# Patient Record
Sex: MALE | Race: WHITE | Employment: FULL TIME | URBAN - METROPOLITAN AREA
[De-identification: names, ages, dates, MRNs, and addresses within clinical notes are randomized per-mention and may not be internally consistent; named-entity substitution may affect disease eponyms.]

---

## 2020-06-06 ENCOUNTER — APPOINTMENT (OUTPATIENT)
Dept: MRI IMAGING | Age: 60
DRG: 063 | End: 2020-06-06
Attending: HOSPITALIST

## 2020-06-06 ENCOUNTER — HOSPITAL ENCOUNTER (INPATIENT)
Age: 60
LOS: 1 days | Discharge: HOME HEALTH | DRG: 063 | End: 2020-06-07
Attending: EMERGENCY MEDICINE | Admitting: HOSPITALIST

## 2020-06-06 ENCOUNTER — APPOINTMENT (OUTPATIENT)
Dept: GENERAL RADIOLOGY | Age: 60
DRG: 063 | End: 2020-06-06
Attending: EMERGENCY MEDICINE

## 2020-06-06 ENCOUNTER — APPOINTMENT (OUTPATIENT)
Dept: CT IMAGING | Age: 60
DRG: 063 | End: 2020-06-06
Attending: EMERGENCY MEDICINE

## 2020-06-06 ENCOUNTER — APPOINTMENT (OUTPATIENT)
Dept: NON INVASIVE DIAGNOSTICS | Age: 60
DRG: 063 | End: 2020-06-06
Attending: HOSPITALIST

## 2020-06-06 DIAGNOSIS — R20.0 RIGHT SIDED NUMBNESS: Primary | ICD-10-CM

## 2020-06-06 DIAGNOSIS — I63.9: ICD-10-CM

## 2020-06-06 DIAGNOSIS — R44.8: ICD-10-CM

## 2020-06-06 DIAGNOSIS — E11.9 NEW ONSET TYPE 2 DIABETES MELLITUS (HCC): ICD-10-CM

## 2020-06-06 PROBLEM — R73.9 HYPERGLYCEMIA: Status: ACTIVE | Noted: 2020-06-06

## 2020-06-06 LAB
ANION GAP SERPL CALC-SCNC: 5 MMOL/L (ref 3–18)
APPEARANCE UR: CLEAR
APTT PPP: 28.9 SEC (ref 23–36.4)
BASOPHILS # BLD: 0.1 K/UL (ref 0–0.1)
BASOPHILS NFR BLD: 1 % (ref 0–2)
BILIRUB UR QL: NEGATIVE
BUN SERPL-MCNC: 11 MG/DL (ref 7–18)
BUN/CREAT SERPL: 10 (ref 12–20)
CALCIUM SERPL-MCNC: 9 MG/DL (ref 8.5–10.1)
CHLORIDE SERPL-SCNC: 102 MMOL/L (ref 100–111)
CK MB CFR SERPL CALC: 0.9 % (ref 0–4)
CK MB SERPL-MCNC: 1.8 NG/ML (ref 5–25)
CK SERPL-CCNC: 198 U/L (ref 39–308)
CO2 SERPL-SCNC: 28 MMOL/L (ref 21–32)
COLOR UR: YELLOW
CREAT SERPL-MCNC: 1.08 MG/DL (ref 0.6–1.3)
DIFFERENTIAL METHOD BLD: ABNORMAL
ECHO AO ASC DIAM: 3.17 CM
ECHO AO ROOT DIAM: 2.92 CM
ECHO IVC SNIFF: 1.57 CM
ECHO LA MAJOR AXIS: 3.75 CM
ECHO LA TO AORTIC ROOT RATIO: 1.28
ECHO LV EDV A2C: 104.4 ML
ECHO LV EDV A4C: 111.5 ML
ECHO LV EDV BP: 108.7 ML (ref 67–155)
ECHO LV EDV INDEX A4C: 52.9 ML/M2
ECHO LV EDV INDEX BP: 51.5 ML/M2
ECHO LV EDV NDEX A2C: 49.5 ML/M2
ECHO LV EDV TEICHHOLZ: 0.55 ML
ECHO LV EJECTION FRACTION A2C: 52 %
ECHO LV EJECTION FRACTION A4C: 63 %
ECHO LV EJECTION FRACTION BIPLANE: 57.7 % (ref 55–100)
ECHO LV ESV A2C: 50.2 ML
ECHO LV ESV A4C: 41 ML
ECHO LV ESV BP: 46 ML (ref 22–58)
ECHO LV ESV INDEX A2C: 23.8 ML/M2
ECHO LV ESV INDEX A4C: 19.4 ML/M2
ECHO LV ESV INDEX BP: 21.8 ML/M2
ECHO LV ESV TEICHHOLZ: 0.41 ML
ECHO LV INTERNAL DIMENSION DIASTOLIC: 4.62 CM (ref 4.2–5.9)
ECHO LV INTERNAL DIMENSION SYSTOLIC: 4.09 CM
ECHO LV IVSD: 1.37 CM (ref 0.6–1)
ECHO LV MASS 2D: 290.5 G (ref 88–224)
ECHO LV MASS INDEX 2D: 137.7 G/M2 (ref 49–115)
ECHO LV POSTERIOR WALL DIASTOLIC: 1.33 CM (ref 0.6–1)
ECHO LVOT DIAM: 2.53 CM
ECHO MV A VELOCITY: 55.49 CM/S
ECHO MV E DECELERATION TIME (DT): 186.4 MS
ECHO MV E VELOCITY: 48.21 CM/S
ECHO MV E/A RATIO: 0.87
ECHO RA MINOR AXIS: 4.02 CM
EOSINOPHIL # BLD: 0.3 K/UL (ref 0–0.4)
EOSINOPHIL NFR BLD: 4 % (ref 0–5)
ERYTHROCYTE [DISTWIDTH] IN BLOOD BY AUTOMATED COUNT: 11.9 % (ref 11.6–14.5)
EST. AVERAGE GLUCOSE BLD GHB EST-MCNC: 278 MG/DL
GLUCOSE BLD STRIP.AUTO-MCNC: 219 MG/DL (ref 70–110)
GLUCOSE BLD STRIP.AUTO-MCNC: 220 MG/DL (ref 70–110)
GLUCOSE BLD STRIP.AUTO-MCNC: 264 MG/DL (ref 70–110)
GLUCOSE BLD STRIP.AUTO-MCNC: 326 MG/DL (ref 70–110)
GLUCOSE SERPL-MCNC: 337 MG/DL (ref 74–99)
GLUCOSE UR STRIP.AUTO-MCNC: >1000 MG/DL
HBA1C MFR BLD: 11.3 % (ref 4.2–5.6)
HCT VFR BLD AUTO: 45.4 % (ref 36–48)
HGB BLD-MCNC: 16 G/DL (ref 13–16)
HGB UR QL STRIP: NEGATIVE
INR PPP: 1 (ref 0.8–1.2)
KETONES UR QL STRIP.AUTO: NEGATIVE MG/DL
LEUKOCYTE ESTERASE UR QL STRIP.AUTO: NEGATIVE
LVFS 2D: 11.51 %
LVSV (MOD BI): 29.3 ML
LVSV (MOD SINGLE 4C): 32.91 ML
LVSV (MOD SINGLE): 25.31 ML
LVSV (TEICH): 11.47 ML
LYMPHOCYTES # BLD: 4 K/UL (ref 0.9–3.6)
LYMPHOCYTES NFR BLD: 49 % (ref 21–52)
MAGNESIUM SERPL-MCNC: 1.9 MG/DL (ref 1.6–2.6)
MCH RBC QN AUTO: 31.1 PG (ref 24–34)
MCHC RBC AUTO-ENTMCNC: 35.2 G/DL (ref 31–37)
MCV RBC AUTO: 88.2 FL (ref 74–97)
MONOCYTES # BLD: 0.9 K/UL (ref 0.05–1.2)
MONOCYTES NFR BLD: 11 % (ref 3–10)
MV DEC SLOPE: 2.59
NEUTS SEG # BLD: 2.8 K/UL (ref 1.8–8)
NEUTS SEG NFR BLD: 35 % (ref 40–73)
NITRITE UR QL STRIP.AUTO: NEGATIVE
PH UR STRIP: 6 [PH] (ref 5–8)
PLATELET # BLD AUTO: 258 K/UL (ref 135–420)
PMV BLD AUTO: 9.2 FL (ref 9.2–11.8)
POTASSIUM SERPL-SCNC: 3.7 MMOL/L (ref 3.5–5.5)
PROT UR STRIP-MCNC: NEGATIVE MG/DL
PROTHROMBIN TIME: 13.3 SEC (ref 11.5–15.2)
RBC # BLD AUTO: 5.15 M/UL (ref 4.7–5.5)
SODIUM SERPL-SCNC: 135 MMOL/L (ref 136–145)
SP GR UR REFRACTOMETRY: >1.03 (ref 1–1.03)
TROPONIN I SERPL-MCNC: <0.02 NG/ML (ref 0–0.04)
UROBILINOGEN UR QL STRIP.AUTO: 0.2 EU/DL (ref 0.2–1)
WBC # BLD AUTO: 8.1 K/UL (ref 4.6–13.2)

## 2020-06-06 PROCEDURE — 80048 BASIC METABOLIC PNL TOTAL CA: CPT

## 2020-06-06 PROCEDURE — 74011636637 HC RX REV CODE- 636/637: Performed by: HOSPITALIST

## 2020-06-06 PROCEDURE — 74011250636 HC RX REV CODE- 250/636: Performed by: EMERGENCY MEDICINE

## 2020-06-06 PROCEDURE — 65610000006 HC RM INTENSIVE CARE

## 2020-06-06 PROCEDURE — 83036 HEMOGLOBIN GLYCOSYLATED A1C: CPT

## 2020-06-06 PROCEDURE — 74011000258 HC RX REV CODE- 258: Performed by: EMERGENCY MEDICINE

## 2020-06-06 PROCEDURE — 85025 COMPLETE CBC W/AUTO DIFF WBC: CPT

## 2020-06-06 PROCEDURE — 77030021566 MRA NECK W WO CONT

## 2020-06-06 PROCEDURE — 93005 ELECTROCARDIOGRAM TRACING: CPT

## 2020-06-06 PROCEDURE — 81003 URINALYSIS AUTO W/O SCOPE: CPT

## 2020-06-06 PROCEDURE — 82550 ASSAY OF CK (CPK): CPT

## 2020-06-06 PROCEDURE — 74011250637 HC RX REV CODE- 250/637: Performed by: HOSPITALIST

## 2020-06-06 PROCEDURE — 70450 CT HEAD/BRAIN W/O DYE: CPT

## 2020-06-06 PROCEDURE — A9575 INJ GADOTERATE MEGLUMI 0.1ML: HCPCS | Performed by: EMERGENCY MEDICINE

## 2020-06-06 PROCEDURE — 37195 THROMBOLYTIC THERAPY STROKE: CPT

## 2020-06-06 PROCEDURE — 3E03317 INTRODUCTION OF OTHER THROMBOLYTIC INTO PERIPHERAL VEIN, PERCUTANEOUS APPROACH: ICD-10-PCS | Performed by: EMERGENCY MEDICINE

## 2020-06-06 PROCEDURE — 70551 MRI BRAIN STEM W/O DYE: CPT

## 2020-06-06 PROCEDURE — 99291 CRITICAL CARE FIRST HOUR: CPT

## 2020-06-06 PROCEDURE — 71045 X-RAY EXAM CHEST 1 VIEW: CPT

## 2020-06-06 PROCEDURE — 83735 ASSAY OF MAGNESIUM: CPT

## 2020-06-06 PROCEDURE — 74011000250 HC RX REV CODE- 250: Performed by: EMERGENCY MEDICINE

## 2020-06-06 PROCEDURE — 72125 CT NECK SPINE W/O DYE: CPT

## 2020-06-06 PROCEDURE — 85730 THROMBOPLASTIN TIME PARTIAL: CPT

## 2020-06-06 PROCEDURE — 97162 PT EVAL MOD COMPLEX 30 MIN: CPT

## 2020-06-06 PROCEDURE — 82962 GLUCOSE BLOOD TEST: CPT

## 2020-06-06 PROCEDURE — 93306 TTE W/DOPPLER COMPLETE: CPT

## 2020-06-06 PROCEDURE — 70544 MR ANGIOGRAPHY HEAD W/O DYE: CPT

## 2020-06-06 PROCEDURE — 85610 PROTHROMBIN TIME: CPT

## 2020-06-06 PROCEDURE — 74011250637 HC RX REV CODE- 250/637: Performed by: EMERGENCY MEDICINE

## 2020-06-06 RX ORDER — INSULIN GLARGINE 100 [IU]/ML
0.2 INJECTION, SOLUTION SUBCUTANEOUS DAILY
Status: DISCONTINUED | OUTPATIENT
Start: 2020-06-06 | End: 2020-06-06

## 2020-06-06 RX ORDER — ACETAMINOPHEN 325 MG/1
650 TABLET ORAL
Status: DISCONTINUED | OUTPATIENT
Start: 2020-06-06 | End: 2020-06-07 | Stop reason: HOSPADM

## 2020-06-06 RX ORDER — INSULIN GLARGINE 100 [IU]/ML
0.26 INJECTION, SOLUTION SUBCUTANEOUS DAILY
Status: DISCONTINUED | OUTPATIENT
Start: 2020-06-07 | End: 2020-06-07 | Stop reason: HOSPADM

## 2020-06-06 RX ORDER — MAGNESIUM SULFATE 100 %
4 CRYSTALS MISCELLANEOUS AS NEEDED
Status: DISCONTINUED | OUTPATIENT
Start: 2020-06-06 | End: 2020-06-07 | Stop reason: HOSPADM

## 2020-06-06 RX ORDER — SODIUM CHLORIDE 0.9 % (FLUSH) 0.9 %
5-40 SYRINGE (ML) INJECTION AS NEEDED
Status: DISCONTINUED | OUTPATIENT
Start: 2020-06-06 | End: 2020-06-07 | Stop reason: HOSPADM

## 2020-06-06 RX ORDER — ASPIRIN 325 MG
325 TABLET ORAL DAILY
Status: DISCONTINUED | OUTPATIENT
Start: 2020-06-07 | End: 2020-06-07 | Stop reason: HOSPADM

## 2020-06-06 RX ORDER — INSULIN GLARGINE 100 [IU]/ML
4 INJECTION, SOLUTION SUBCUTANEOUS ONCE
Status: COMPLETED | OUTPATIENT
Start: 2020-06-06 | End: 2020-06-06

## 2020-06-06 RX ORDER — GADOTERATE MEGLUMINE 376.9 MG/ML
20 INJECTION INTRAVENOUS
Status: COMPLETED | OUTPATIENT
Start: 2020-06-06 | End: 2020-06-06

## 2020-06-06 RX ORDER — NICARDIPINE HYDROCHLORIDE 0.1 MG/ML
5 INJECTION INTRAVENOUS CONTINUOUS
Status: DISCONTINUED | OUTPATIENT
Start: 2020-06-06 | End: 2020-06-07 | Stop reason: HOSPADM

## 2020-06-06 RX ORDER — ROSUVASTATIN CALCIUM 10 MG/1
40 TABLET, COATED ORAL
Status: DISCONTINUED | OUTPATIENT
Start: 2020-06-06 | End: 2020-06-06

## 2020-06-06 RX ORDER — SODIUM CHLORIDE 0.9 % (FLUSH) 0.9 %
5-40 SYRINGE (ML) INJECTION EVERY 8 HOURS
Status: DISCONTINUED | OUTPATIENT
Start: 2020-06-06 | End: 2020-06-07 | Stop reason: HOSPADM

## 2020-06-06 RX ORDER — INSULIN LISPRO 100 [IU]/ML
INJECTION, SOLUTION INTRAVENOUS; SUBCUTANEOUS
Status: DISCONTINUED | OUTPATIENT
Start: 2020-06-06 | End: 2020-06-07 | Stop reason: HOSPADM

## 2020-06-06 RX ORDER — DEXTROSE MONOHYDRATE 100 MG/ML
125-250 INJECTION, SOLUTION INTRAVENOUS AS NEEDED
Status: DISCONTINUED | OUTPATIENT
Start: 2020-06-06 | End: 2020-06-07 | Stop reason: HOSPADM

## 2020-06-06 RX ORDER — SODIUM CHLORIDE 0.9 % (FLUSH) 0.9 %
5-40 SYRINGE (ML) INJECTION EVERY 8 HOURS
Status: DISCONTINUED | OUTPATIENT
Start: 2020-06-06 | End: 2020-06-06

## 2020-06-06 RX ORDER — SODIUM CHLORIDE 0.9 % (FLUSH) 0.9 %
5-40 SYRINGE (ML) INJECTION AS NEEDED
Status: DISCONTINUED | OUTPATIENT
Start: 2020-06-06 | End: 2020-06-06

## 2020-06-06 RX ORDER — ATORVASTATIN CALCIUM 20 MG/1
80 TABLET, FILM COATED ORAL
Status: DISCONTINUED | OUTPATIENT
Start: 2020-06-06 | End: 2020-06-07 | Stop reason: HOSPADM

## 2020-06-06 RX ORDER — ENOXAPARIN SODIUM 100 MG/ML
40 INJECTION SUBCUTANEOUS EVERY 24 HOURS
Status: DISCONTINUED | OUTPATIENT
Start: 2020-06-07 | End: 2020-06-07 | Stop reason: HOSPADM

## 2020-06-06 RX ORDER — ACETAMINOPHEN 500 MG
1000 TABLET ORAL
Status: COMPLETED | OUTPATIENT
Start: 2020-06-06 | End: 2020-06-06

## 2020-06-06 RX ORDER — ONDANSETRON 2 MG/ML
4 INJECTION INTRAMUSCULAR; INTRAVENOUS
Status: DISCONTINUED | OUTPATIENT
Start: 2020-06-06 | End: 2020-06-06 | Stop reason: SDUPTHER

## 2020-06-06 RX ORDER — ONDANSETRON 2 MG/ML
4 INJECTION INTRAMUSCULAR; INTRAVENOUS
Status: DISCONTINUED | OUTPATIENT
Start: 2020-06-06 | End: 2020-06-07 | Stop reason: HOSPADM

## 2020-06-06 RX ADMIN — INSULIN LISPRO 6 UNITS: 100 INJECTION, SOLUTION INTRAVENOUS; SUBCUTANEOUS at 21:36

## 2020-06-06 RX ADMIN — ACETAMINOPHEN 1000 MG: 500 TABLET, FILM COATED ORAL at 03:13

## 2020-06-06 RX ADMIN — GADOTERATE MEGLUMINE 20 ML: 376.9 INJECTION INTRAVENOUS at 09:15

## 2020-06-06 RX ADMIN — ACETAMINOPHEN 650 MG: 325 TABLET, FILM COATED ORAL at 16:34

## 2020-06-06 RX ADMIN — Medication 10 ML: at 21:36

## 2020-06-06 RX ADMIN — SODIUM CHLORIDE 50 ML: 900 INJECTION, SOLUTION INTRAVENOUS at 01:55

## 2020-06-06 RX ADMIN — INSULIN GLARGINE 19 UNITS: 100 INJECTION, SOLUTION SUBCUTANEOUS at 12:02

## 2020-06-06 RX ADMIN — Medication 10 ML: at 12:06

## 2020-06-06 RX ADMIN — ALTEPLASE 75.33 MG: KIT at 01:04

## 2020-06-06 RX ADMIN — ROSUVASTATIN CALCIUM 40 MG: 20 TABLET, COATED ORAL at 02:17

## 2020-06-06 RX ADMIN — ATORVASTATIN CALCIUM 80 MG: 20 TABLET, FILM COATED ORAL at 21:16

## 2020-06-06 RX ADMIN — INSULIN LISPRO 4 UNITS: 100 INJECTION, SOLUTION INTRAVENOUS; SUBCUTANEOUS at 17:27

## 2020-06-06 RX ADMIN — INSULIN LISPRO 6 UNITS: 100 INJECTION, SOLUTION INTRAVENOUS; SUBCUTANEOUS at 12:02

## 2020-06-06 RX ADMIN — Medication 10 ML: at 01:16

## 2020-06-06 RX ADMIN — INSULIN GLARGINE 4 UNITS: 100 INJECTION, SOLUTION SUBCUTANEOUS at 22:40

## 2020-06-06 RX ADMIN — ALTEPLASE 8.37 MG: KIT at 01:03

## 2020-06-06 RX ADMIN — ACETAMINOPHEN 650 MG: 325 TABLET, FILM COATED ORAL at 22:41

## 2020-06-06 NOTE — PROGRESS NOTES
Problem: Discharge Planning  Goal: *Discharge to safe environment  Outcome: Progressing Towards Goal  Plan is home with home health -[id number] Provider list has been given to the patient and/or patient representative. Patient and/or patient representative has signed the [facility] of Choice selecting __[facility]as their preference agency and a copy given. Both Home Health Provider list and Freedom of Choice have been placed on the chart. Referal placed in chart and faxed to BS. Patient works so will not qualify for PT/OT - not bed bound. Also he has to  self pay for any extra therapy- no insurance. If needs to get a walker- he will need to self pay for that also. Reason for Admission:  Right side weakness and numbness                    RUR Score:  8%                   Plan for utilizing home health:   Qualifies for [facility]   - A1c 11.3    PCP: none I put note in \"follow up \" for pt to call [facility] Monday or Tuesday to make an appt. First and Last name:     Name of Practice:    Are you a current patient: Yes/No:    Approximate date of last visit:    Can you participate in a virtual visit with your PCP:                     Current Advanced Directive/Advance Care Plan: none, but patient interested In it- Page to  to come to see pt to make one. Transition of Care Plan: Chart reviewed and pt  Verified demographics. He works at one of his [employer]- he does not have any insurance thru them- very small numbers of employees. Patient has designated _Mother______ to participate in his/her discharge plan and to receive any needed information. Name: Violeta Medrano  Address:  Phone number:  [phone]    ALSO- patient completed ACP- his Ivykirby Gee will be listed and  says he put a copy in the paper chart to scan chart.         Care Management Interventions  PCP Verified by CM: No  Palliative Care Criteria Met (RRAT>21 & CHF Dx)?: No  Mode of Transport at Discharge: Other (see comment)(Sister to drive)  Transition of Care Consult (CM Consult): Discharge Planning  MyChart Signup: No  Discharge Durable Medical Equipment: No  Physical Therapy Consult: Yes  Occupational Therapy Consult: Yes  Speech Therapy Consult: Yes       Onel Trevino.  ANTWON Ibanez  [facility]  [phone], Pager [phone]  [email or web address]

## 2020-06-06 NOTE — ED NOTES
TRANSFER - ED to INPATIENT REPORT:    Verbal report given to Floresita(name) on Kristofer Colbert  being transferred to [facility](unit) for routine progression of care       Report consisted of patients Situation, Background, Assessment and   Recommendations(SBAR). SBAR report made available to receiving floor on this patient being transferred to [address] [place] [address] ICU [id number])  for routine progression of care       Admitting diagnosis CVA (cerebral vascular accident) [facility]) [I63.9]    Information from the following report(s) SBAR, MAR, Recent Results and Cardiac Rhythm NSR was made available to receiving floor. Lines:   Peripheral IV 06/06/20 Left Antecubital (Active)   Site Assessment Clean, dry, & intact 6/6/2020 12:00 AM   Phlebitis Assessment 0 6/6/2020 12:00 AM   Infiltration Assessment 0 6/6/2020 12:00 AM   Dressing Type Transparent;Tape 6/6/2020 12:00 AM   Hub Color/Line Status Green;Capped;Flushed;Patent 6/6/2020 12:00 AM   Action Taken Open ports on tubing capped 6/6/2020 12:00 AM   Alcohol Cap Used Yes 6/6/2020 12:00 AM       Peripheral IV 06/06/20 Distal;Right Antecubital (Active)   Site Assessment Clean, dry, & intact 6/6/2020 12:39 AM   Phlebitis Assessment 0 6/6/2020 12:39 AM   Infiltration Assessment 0 6/6/2020 12:39 AM   Dressing Type Transparent;Tape 6/6/2020 12:39 AM   Hub Color/Line Status Green;Capped;Flushed;Patent 6/6/2020 12:39 AM   Action Taken Open ports on tubing capped 6/6/2020 12:39 AM   Alcohol Cap Used Yes 6/6/2020 12:39 AM        HCG Status for ALL Females under 55 y/o: NO     Medication list confirmed with patient    Opportunity for questions and clarification was provided.       Patient is oriented to time, place, person and situation Last NIH 1  Patient is  continent and ambulatory with assist     Valuables transported with patient     Patient transported with:   Monitor  Registered Nurse    MAP (Monitor): 95 =Monitored (most recent)  Vitals w/ MEWS Score (last day)     Date/Time MEWS Score Pulse Resp Temp BP Level of Consciousness SpO2    06/06/20 0730    69  18  98 °F (36.7 °C)  143/79    96 %    06/06/20 0715    72  17    151/86    99 %    06/06/20 0700    74  19    161/90    97 %    06/06/20 0645    71  18    (!) 157/91    99 %    06/06/20 0630    72  12    (!) 160/92    97 %    06/06/20 0615    73  22    144/90    98 %    06/06/20 0600    69  18    151/86    97 %    06/06/20 0545    63  17    138/81    96 %    06/06/20 0530    66  18    143/83    96 %    06/06/20 0515    70  17    (!) 153/98    98 %    06/06/20 0500    69  19    139/90    94 %    06/06/20 0445    72  23    145/90    97 %    06/06/20 0430    73  18    153/87    96 %    06/06/20 0415    74  19    (!) 156/91    96 %    06/06/20 0400    76  21  98.2 °F (36.8 °C)  (!) 156/94    97 %    06/06/20 0345    77  18    159/87    96 %    06/06/20 0330    79  18    162/88    98 %    06/06/20 0315    80  18    162/86    98 %    06/06/20 0300    82  17    161/88    96 %    06/06/20 0245    79  14    (!) 162/92    99 %    06/06/20 0230    82  16    161/90    95 %    06/06/20 0215    86  18    (!) 169/97    98 %    06/06/20 0200    88  15    159/83    98 %    06/06/20 0145    86  22    (!) 149/95    99 %    06/06/20 0130    86  17    161/86    97 %    06/06/20 0115    92  20    (!) 174/93    98 %    06/06/20 0100    90  20    161/90    97 %    06/06/20 00:50:51        98.4 °F (36.9 °C)    Alert      06/06/20 0045    92  15    (!) 179/96    97 %    06/06/20 0032    92  17    171/84    99 %    06/06/20 0030    95  11        99 %    06/06/20 0028          157/90    97 %    06/06/20 0015          (!) 155/109        06/06/20 0014  1  93  18  98.2 °F (36.8 °C)  (!) 168/93  Alert  98 %    06/06/20 0009    92  20  98.3 °F (36.8 °C)    Alert                Septic Patients:     Lactic Acid  No results found for: LACPOC (Most recent on top)  Repeat drawn: NO Time: NA     ALL LACTIC ACIDS GREATER THAN 2 MUST BE REPEATED POC WITHIN 4 HOURS OR PRIOR TO ADMISSION               Total Fluid Bolus initiated and documented on MAR: NO    All ordered antibiotics initiated within first 3 hours of TIME ZERO?   NO

## 2020-06-06 NOTE — PROGRESS NOTES
Problem: Discharge Planning  Goal: *Discharge to safe environment  Outcome: Progressing Towards Goal  Plan is home with home health -[facility]

## 2020-06-06 NOTE — ED PROVIDER NOTES
EMERGENCY DEPARTMENT HISTORY AND PHYSICAL EXAM    Date: 6/6/2020  Patient Name: Owen Ellis    History of Presenting Illness     Chief complaint right-sided numbness      History Provided By: Patient and EMS    Additional History (Context):   12:14 AM  Owen Ellis is a 60 y.o. male with PMHX of previous good health who presents to the emergency department C/O right-sided numbness from neck to foot. Patient was also walking his dog when he had sudden onset right-sided numbness time of onset was 11:40 PM.  He did not have any symptoms of weakness, chest pain headache or neck pain. He denies any visual scotomas nausea. He has no belly pain and denies any previous similar symptoms. He is never had any cardiac arrhythmias heart attacks or strokes in his past.  He does have positive family history of sister having a stroke who is 11 months older. He denies any smoking drinking or drugs she quit smoking a number of years ago. Social History  Quit smoking several years ago, denies alcohol or substance use. Family History  Sibling sister with strokes she is 3years older. PCP: None        Past History     Past Medical History:  No past medical history on file. Past Surgical History:  No past surgical history on file. Family History:  No family history on file. Social History:  Social History     Tobacco Use    Smoking status: Not on file   Substance Use Topics    Alcohol use: Not on file    Drug use: Not on file       Allergies:  No Known Allergies      Review of Systems   Review of Systems   Neurological: Positive for numbness. Poor coordination to right lower extremity   All other systems reviewed and are negative.         Physical Exam     Vitals:    06/06/20 0230 06/06/20 0245 06/06/20 0300 06/06/20 0315   BP: 161/90 (!) 162/92 161/88 162/86   Pulse: 82 79 82 80   Resp: 16 14 17 18   Temp:       SpO2: 95% 99% 96% 98%   Weight:       Height:         Physical Exam  Vitals signs and nursing note reviewed. Constitutional:       General: He is not in acute distress. Appearance: He is well-developed. He is not diaphoretic. HENT:      Head: Normocephalic and atraumatic. Right Ear: External ear normal.      Left Ear: External ear normal.      Mouth/Throat:      Pharynx: No oropharyngeal exudate. Eyes:      General: No scleral icterus. Conjunctiva/sclera: Conjunctivae normal.      Pupils: Pupils are equal, round, and reactive to light. Comments: No pallor   Neck:      Musculoskeletal: Normal range of motion and neck supple. Thyroid: No thyromegaly. Vascular: No JVD. Trachea: No tracheal deviation. Cardiovascular:      Rate and Rhythm: Normal rate and regular rhythm. Heart sounds: Normal heart sounds. Pulmonary:      Effort: Pulmonary effort is normal. No respiratory distress. Breath sounds: Normal breath sounds. No stridor. Abdominal:      General: Bowel sounds are normal. There is no distension. Palpations: Abdomen is soft. Tenderness: There is no abdominal tenderness. There is no guarding or rebound. Musculoskeletal: Normal range of motion. General: No tenderness. Comments: No soft tissue injuries   Lymphadenopathy:      Cervical: No cervical adenopathy. Skin:     General: Skin is warm and dry. Findings: No erythema or rash. Neurological:      Mental Status: He is alert and oriented to person, place, and time. GCS: GCS eye subscore is 4. GCS verbal subscore is 5. GCS motor subscore is 6. Cranial Nerves: No cranial nerve deficit. Sensory: Sensory deficit present. Motor: Motor function is intact. No weakness, tremor or atrophy. Coordination: Coordination normal.      Deep Tendon Reflexes: Reflexes are normal and symmetric. Reflexes normal.      Reflex Scores:       Patellar reflexes are 2+ on the right side and 2+ on the left side. Comments:  There is diminished sensation to the right upper and right lower extremity as well as some diminished perceived sensation to the right flank and torso. There is also some decreased coordination in the right lower extremity. No pronator drift normal rapid alternating movements of upper extremities   Psychiatric:         Behavior: Behavior normal.         Thought Content: Thought content normal.         Judgment: Judgment normal.           Diagnostic Study Results     Labs -     Recent Results (from the past 12 hour(s))   CBC WITH AUTOMATED DIFF    Collection Time: 06/06/20 12:10 AM   Result Value Ref Range    WBC 8.1 4.6 - 13.2 K/uL    RBC 5.15 4.70 - 5.50 M/uL    HGB 16.0 13.0 - 16.0 g/dL    HCT 45.4 36.0 - 48.0 %    MCV 88.2 74.0 - 97.0 FL    MCH 31.1 24.0 - 34.0 PG    MCHC 35.2 31.0 - 37.0 g/dL    RDW 11.9 11.6 - 14.5 %    PLATELET 067 948 - 759 K/uL    MPV 9.2 9.2 - 11.8 FL    NEUTROPHILS 35 (L) 40 - 73 %    LYMPHOCYTES 49 21 - 52 %    MONOCYTES 11 (H) 3 - 10 %    EOSINOPHILS 4 0 - 5 %    BASOPHILS 1 0 - 2 %    ABS. NEUTROPHILS 2.8 1.8 - 8.0 K/UL    ABS. LYMPHOCYTES 4.0 (H) 0.9 - 3.6 K/UL    ABS. MONOCYTES 0.9 0.05 - 1.2 K/UL    ABS. EOSINOPHILS 0.3 0.0 - 0.4 K/UL    ABS.  BASOPHILS 0.1 0.0 - 0.1 K/UL    DF AUTOMATED     METABOLIC PANEL, BASIC    Collection Time: 06/06/20 12:10 AM   Result Value Ref Range    Sodium 135 (L) 136 - 145 mmol/L    Potassium 3.7 3.5 - 5.5 mmol/L    Chloride 102 100 - 111 mmol/L    CO2 28 21 - 32 mmol/L    Anion gap 5 3.0 - 18 mmol/L    Glucose 337 (H) 74 - 99 mg/dL    BUN 11 7.0 - 18 MG/DL    Creatinine 1.08 0.6 - 1.3 MG/DL    BUN/Creatinine ratio 10 (L) 12 - 20      GFR est AA >60 >60 ml/min/1.73m2    GFR est non-AA >60 >60 ml/min/1.73m2    Calcium 9.0 8.5 - 10.1 MG/DL   MAGNESIUM    Collection Time: 06/06/20 12:10 AM   Result Value Ref Range    Magnesium 1.9 1.6 - 2.6 mg/dL   CARDIAC PANEL,(CK, CKMB & TROPONIN)    Collection Time: 06/06/20 12:10 AM   Result Value Ref Range    CK - MB 1.8 <3.6 ng/ml    CK-MB Index 0.9 0.0 - 4.0 %     39 - 308 U/L    Troponin-I, QT <0.02 0.0 - 0.045 NG/ML   PROTHROMBIN TIME + INR    Collection Time: 06/06/20 12:10 AM   Result Value Ref Range    Prothrombin time 13.3 11.5 - 15.2 sec    INR 1.0 0.8 - 1.2     PTT    Collection Time: 06/06/20 12:10 AM   Result Value Ref Range    aPTT 28.9 23.0 - 36.4 SEC   GLUCOSE, POC    Collection Time: 06/06/20 12:11 AM   Result Value Ref Range    Glucose (POC) 326 (H) 70 - 110 mg/dL   URINALYSIS W/ RFLX MICROSCOPIC    Collection Time: 06/06/20  2:09 AM   Result Value Ref Range    Color YELLOW      Appearance CLEAR      Specific gravity >1.030 (H) 1.005 - 1.030    pH (UA) 6.0 5.0 - 8.0      Protein Negative NEG mg/dL    Glucose >1,000 (A) NEG mg/dL    Ketone Negative NEG mg/dL    Bilirubin Negative NEG      Blood Negative NEG      Urobilinogen 0.2 0.2 - 1.0 EU/dL    Nitrites Negative NEG      Leukocyte Esterase Negative NEG         Radiologic Studies -   CT SPINE CERV WO CONT   Final Result   IMPRESSION:      No cervical spine fracture or acute malalignment. CT HEAD WO CONT   Final Result   IMPRESSION:      No acute intracranial abnormalities. Critical, CODE S findings discussed with Dr. Aurelio Aponte in the emergency department   during initial examination review 12:36 PM.      XR CHEST PORT    (Results Pending)     CT Results  (Last 48 hours)               06/06/20 0024  CT SPINE CERV WO CONT Final result    Impression:  IMPRESSION:       No cervical spine fracture or acute malalignment. Narrative:  EXAM: CT cervical spine       INDICATION: Right heminumbness. COMPARISON: None. TECHNIQUE: Axial CT imaging of the cervical spine was performed from the skull   base to the thoracic inlet without intravenous contrast. Multiplanar reformats   were generated. One or more dose reduction techniques were used on this CT:   automated exposure control, adjustment of the mAs and/or kVp according to   patient size, and iterative reconstruction techniques. The specific techniques   used on this CT exam have been documented in the patient's electronic medical   record. Digital Imaging and Communications in the Medicine (DICOM) format image   data are available to nonaffiliated external healthcare facilities or entities   on a secure, media free, reciprocally searchable basis with patient   authorization for at least a 12 month period after this study. _______________       FINDINGS:       VERTEBRAE AND DISCS: No cervical spine fracture or acute appearing malalignment. There is mild degenerative disc space narrowing at C6-7 with trace associated   retrolisthesis. Mild hypertrophic degenerative changes are present. SPINAL CANAL AND FORAMINA: Central canal appears adequate. There is mild neural   foraminal narrowing on the left at C6-7. PREVERTEBRAL SOFT TISSUES: No hematoma. OTHER: None.       _______________           06/06/20 0020  CT HEAD WO CONT Final result    Impression:  IMPRESSION:       No acute intracranial abnormalities. Critical, CODE S findings discussed with Dr. Fadi Goldstein in the emergency department   during initial examination review 12:36 PM.       Narrative:  EXAM: CT head       INDICATION: CODE S. Right-sided numbness. COMPARISON: None. TECHNIQUE: Axial CT imaging of the head was performed without intravenous   contrast. One or more dose reduction techniques were used on this CT: automated   exposure control, adjustment of the mAs and/or kVp according to patient size,   and iterative reconstruction techniques. The specific techniques used on this   CT exam have been documented in the patient's electronic medical record. Digital   Imaging and Communications in the Medicine (DICOM) format image data are   available to nonaffiliated external healthcare facilities or entities on a   secure, media free, reciprocally searchable basis with patient authorization for   at least a 12 month period after this study. _______________       FINDINGS:       BRAIN AND POSTERIOR FOSSA: The sulci, folia, ventricles and basal cisterns are   within normal limits for the patient's age. There is no intracranial hemorrhage,   mass effect, or midline shift. There are no areas of abnormal parenchymal   attenuation. Cerebral atherosclerosis noted. EXTRA-AXIAL SPACES AND MENINGES: There are no abnormal extra-axial fluid   collections. CALVARIUM: Intact. SINUSES: Clear. OTHER: None.       _______________               CXR Results  (Last 48 hours)    None          Medications given in the ED-  Medications   sodium chloride (NS) flush 5-40 mL (10 mL IntraVENous Given 6/6/20 0116)   sodium chloride (NS) flush 5-40 mL (has no administration in time range)   ondansetron (ZOFRAN) injection 4 mg (has no administration in time range)   rosuvastatin (CRESTOR) tablet 40 mg (40 mg Oral Given 6/6/20 0217)   alteplase (ACTIVASE) bolus dose (8.37 mg IntraVENous Given 6/6/20 0103)     Followed by   alteplase (ACTIVASE) infusion 75.33 mg (0 mg IntraVENous IV Completed 6/6/20 0154)     Followed by   sodium chloride 0.9 % bolus infusion 50 mL (0 mL IntraVENous IV Completed 6/6/20 0235)   acetaminophen (TYLENOL) tablet 1,000 mg (1,000 mg Oral Given 6/6/20 0313)         Medical Decision Making   I am the first provider for this patient. I reviewed the vital signs, available nursing notes, past medical history, past surgical history, family history and social history. Vital Signs-Reviewed the patient's vital signs. Pulse Oximetry Analysis -99 % on room air    Cardiac Monitor:  Rate: 88 bpm  Rhythm: Sinus rhythm    EKG interpretation: (Preliminary)  12:14 AM   Normal sinus rhythm, rate 91, no arrhythmia, , normal [place] interval, normal QRS, normal ST segments, no delta wave  EKG read by Carlos Kerr MD at 12:13 PM    Records Reviewed: NURSING NOTES AND PREVIOUS MEDICAL RECORDS    Provider Notes (Medical Decision Making): Patient's onset of symptoms are 30 minutes prior to arrival and Code S was called once we get a reasonable history on him. He clearly shows no evidence of weakness abnormal gaze ataxia alcohol intoxication or other substance. He has no headache so is unlikely this represents aneurysm. Images were also be performed of his C-spine to look for radiculopathy or spinal cord lesion including tumor or MS which would be unlikely. Procedures:  Procedures    ED Course:   12:14 AM: Initial assessment performed. The patients presenting problems have been discussed, and they are in agreement with the care plan formulated and outlined with them. I have encouraged them to ask questions as they arise throughout their visit. Diagnosis and Disposition     CRITICAL CARE NOTE:  1:05 AM  I have spent 45 minutes of critical care time involved in lab review, consultations with specialist, family decision-making, and documentation. During this entire length of time I was immediately available to the patient. Critical Care: The reason for providing this level of medical care for this critically ill patient was due a critical illness that impaired one or more vital organ systems such that there was a high probability of imminent or life threatening deterioration in the patients condition. This care involved high complexity decision making to assess, manipulate, and support vital system functions, to treat this degreee vital organ system failure and to prevent further life threatening deterioration of the patients condition. Core Measures:  For Hospitalized Patients:    1. Hospitalization Decision Time:  The decision to hospitalize the patient was made by Ibeth Ramos MD   at 12:30 AM on 6/6/2020    2. Aspirin: Aspirin was not given because the patient is a bleeding risk    1:05 AM  Patient is being admitted to the hospital by Dr. Severa Marking  .  The results of their tests and reasons for their admission have been discussed with them and/or available family. They convey agreement and understanding for the need to be admitted and for their admission diagnosis. CONDITIONS ON ADMISSION:  Sepsis is not present at the time of admission. Deep Vein Thrombosis is not present at the time of admission. Pneumonia is not present at the time of admission. MRSA is not present at the time of admission. Wound infection is not present at the time of admission. Pressure Ulcer is not present at the time of admission. CLINICAL IMPRESSION:    1. Right sided numbness    2. Altered sensation due to acute stroke ([facility]    3. New onset type 2 diabetes mellitus ([facility]        _______________________________    This note was partially transcribed via voice recognition software. Although efforts have been made to catch any discrepancies, it may contain sound alike words, grammatical errors, or nonsensical words.

## 2020-06-06 NOTE — H&P
Medicine History and Physical    Patient: Louise Molina   Age:  60 y.o. Assessment   Principal Problem:    CVA (cerebral vascular accident) ([facility] [address] (6/6/2020)    Active Problems:    Hyperglycemia (6/6/2020)          Plan     1)  Possible CVA   - s/p TPA   - on protocol, ct in 24 hr repeat, lovenox in 24 hrs , ASA in 24 hrs. Statin, cardene for bp if >855 systolic. MRI, echo, MRA   -  ICU admission. PT/OT    2)  Hyperglycemia   - A1c, SSI q achs      DISPO  -Pt to be admitted  at this time for reasons addressed above, continued hospitalization for ongoing assessment and treatment indicated     Anticipated Date of Discharge: 1-2 days  Anticipated Disposition (home, SNF) : home    Chief Complaint:   Chief Complaint   Patient presents with    Numbness         HPI:   Louise Molina is a 60y.o. year old male who presents with  Sudden onset R sided numbness that started a couple hrs before presentation. Numbness present neck to leg or right side. At that time he was walking his dog and besides the numbness noted some weakness on R leg which prompted him to come to ED. There is a + FHx of CVA. The ed provider on arrival did note ataxia due to weakness of the R leg. On my exam weakness appears improved by numbness still present    Review of Systems - positive responses in bold type   Constitutional: Negative for fever, chills, diaphoresis and unexpected weight change. HENT: Negative for ear pain, congestion, sore throat, rhinorrhea, drooling, trouble swallowing, neck pain and tinnitus. Eyes: Negative for photophobia, pain, redness and visual disturbance. Respiratory: negative for shortness of breath, cough, choking, chest tightness, wheezing or stridor. Cardiovascular: Negative for chest pain, palpitations and leg swelling. Gastrointestinal: Negative for nausea, vomiting, abdominal pain, diarrhea, constipation, blood in stool, abdominal distention and anal bleeding.    Genitourinary: Negative for dysuria, urgency, frequency, hematuria, flank pain and difficulty urinating. Musculoskeletal: Negative for back pain and arthralgias. Skin: Negative for color change, rash and wound. Neurological: Negative for dizziness, seizures, syncope, speech difficulty, light-headedness or headaches. Hematological: Does not bruise/bleed easily. Psychiatric/Behavioral: Negative for suicidal ideas, hallucinations, behavioral problems, self-injury or agitation       Past Medical History:  No past medical history on file. Past Surgical History:  No past surgical history on file. Family History:  No family history on file. Social History:  Social History     Socioeconomic History    Marital status: SINGLE     Spouse name: Not on file    Number of children: Not on file    Years of education: Not on file    Highest education level: Not on file       Home Medications:  Prior to Admission medications    Not on File       Allergies:  No Known Allergies        Physical Exam:     Visit Vitals  /90   Pulse 90   Temp 98.4 °F (36.9 °C)   Resp 20   Ht 5' 10\" (1.778 m)   Wt 93 kg (205 lb)   SpO2 97%   BMI 29.41 kg/m²       Physical Exam:  General appearance: alert, cooperative, no distress, appears stated age  Head: Normocephalic, without obvious abnormality, atraumatic  Neck: supple, trachea midline  Lungs: clear to auscultation bilaterally  Heart: regular rate and rhythm, S1, S2 normal, no murmur, click, rub or gallop  Abdomen: soft, non-tender. Bowel sounds normal. No masses,  no organomegaly  Extremities: extremities normal, atraumatic, no cyanosis or edema  Skin: Skin color, texture, turgor normal. No rashes or lesions  Neurologic: subjective parathesia R arm and leg, no noticeable decrease in strength, CN in tact    Intake and Output:  Current Shift:  No intake/output data recorded. Last three shifts:  No intake/output data recorded.     Lab/Data Reviewed:  CMP:   Lab Results   Component Value Date/Time     (L) 06/06/2020 12:10 AM    K 3.7 06/06/2020 12:10 AM     06/06/2020 12:10 AM    CO2 28 06/06/2020 12:10 AM    AGAP 5 06/06/2020 12:10 AM     (H) 06/06/2020 12:10 AM    BUN 11 06/06/2020 12:10 AM    CREA 1.08 06/06/2020 12:10 AM    GFRAA >60 06/06/2020 12:10 AM    GFRNA >60 06/06/2020 12:10 AM    CA 9.0 06/06/2020 12:10 AM    MG 1.9 06/06/2020 12:10 AM     CBC:   Lab Results   Component Value Date/Time    WBC 8.1 06/06/2020 12:10 AM    HGB 16.0 06/06/2020 12:10 AM    HCT 45.4 06/06/2020 12:10 AM     06/06/2020 12:10 AM     All Cardiac Markers in the last 24 hours:   Lab Results   Component Value Date/Time     06/06/2020 12:10 AM    CKMB 1.8 06/06/2020 12:10 AM    CKND1 0.9 06/06/2020 12:10 AM    TROIQ <0.02 06/06/2020 12:10 AM     Jacqueline Fleming MD    June 6, 2020

## 2020-06-06 NOTE — PROGRESS NOTES
Problem: Diabetes Self-Management  Goal: *Disease process and treatment process  Description: Define diabetes and identify own type of diabetes; list 3 options for treating diabetes. Outcome: Progressing Towards Goal  Goal: *Incorporating nutritional management into lifestyle  Description: Describe effect of type, amount and timing of food on blood glucose; list 3 methods for planning meals. Outcome: Progressing Towards Goal  Goal: *Incorporating physical activity into lifestyle  Description: State effect of exercise on blood glucose levels. Outcome: Progressing Towards Goal  Goal: *Developing strategies to promote health/change behavior  Description: Define the ABC's of diabetes; identify appropriate screenings, schedule and personal plan for screenings. Outcome: Progressing Towards Goal  Goal: *Using medications safely  Description: State effect of diabetes medications on diabetes; name diabetes medication taking, action and side effects. Outcome: Progressing Towards Goal  Goal: *Monitoring blood glucose, interpreting and using results  Description: Identify recommended blood glucose targets  and personal targets. Outcome: Progressing Towards Goal  Goal: *Prevention, detection, treatment of acute complications  Description: List symptoms of hyper- and hypoglycemia; describe how to treat low blood sugar and actions for lowering  high blood glucose level. Outcome: Progressing Towards Goal  Goal: *Prevention, detection and treatment of chronic complications  Description: Define the natural course of diabetes and describe the relationship of blood glucose levels to long term complications of diabetes.   Outcome: Progressing Towards Goal  Goal: *Developing strategies to address psychosocial issues  Description: Describe feelings about living with diabetes; identify support needed and support network  Outcome: Progressing Towards Goal  Goal: *Insulin pump training  Outcome: Progressing Towards Goal  Goal: *Sick day guidelines  Outcome: Progressing Towards Goal  Goal: *Patient Specific Goal (EDIT GOAL, INSERT TEXT)  Outcome: Progressing Towards Goal     Problem: Patient Education: Go to Patient Education Activity  Goal: Patient/Family Education  Outcome: Progressing Towards Goal     Problem: Falls - Risk of  Goal: *Absence of Falls  Description: Document Lucy Vogt Fall Risk and appropriate interventions in the flowsheet. Outcome: Progressing Towards Goal  Note: Fall Risk Interventions:  Mobility Interventions: Bed/chair exit alarm         Medication Interventions: Bed/chair exit alarm, Patient to call before getting OOB                   Problem: Patient Education: Go to Patient Education Activity  Goal: Patient/Family Education  Outcome: Progressing Towards Goal     Problem: Pressure Injury - Risk of  Goal: *Prevention of pressure injury  Description: Document Cristobal Scale and appropriate interventions in the flowsheet.   Outcome: Progressing Towards Goal  Note: Pressure Injury Interventions:  Sensory Interventions: Assess changes in LOC, Keep linens dry and wrinkle-free    Moisture Interventions: Offer toileting Q_hr    Activity Interventions: PT/OT evaluation         Nutrition Interventions: Discuss nutritional consult with provider                     Problem: Patient Education: Go to Patient Education Activity  Goal: Patient/Family Education  Outcome: Progressing Towards Goal     Problem: Patient Education: Go to Patient Education Activity  Goal: Patient/Family Education  Outcome: Progressing Towards Goal     Problem: TIA/CVA Stroke: 0-24 hours  Goal: Off Pathway (Use only if patient is Off Pathway)  Outcome: Progressing Towards Goal  Goal: Activity/Safety  Outcome: Progressing Towards Goal  Goal: Consults, if ordered  Outcome: Progressing Towards Goal  Goal: Diagnostic Test/Procedures  Outcome: Progressing Towards Goal  Goal: Nutrition/Diet  Outcome: Progressing Towards Goal  Goal: Discharge Planning  Outcome: Progressing Towards Goal  Goal: Medications  Outcome: Progressing Towards Goal  Goal: Respiratory  Outcome: Progressing Towards Goal  Goal: Treatments/Interventions/Procedures  Outcome: Progressing Towards Goal  Goal: Minimize risk of bleeding post-thrombolytic infusion  Outcome: Progressing Towards Goal  Goal: Monitor for complications post-thrombolytic infusion  Outcome: Progressing Towards Goal  Goal: Psychosocial  Outcome: Progressing Towards Goal  Goal: *Hemodynamically stable  Outcome: Progressing Towards Goal  Goal: *Neurologically stable  Description: Absence of additional neurological deficits    Outcome: Progressing Towards Goal  Goal: *Verbalizes anxiety and depression are reduced or absent  Outcome: Progressing Towards Goal  Goal: *Absence of Signs of Aspiration on Current Diet  Outcome: Progressing Towards Goal  Goal: *Absence of deep venous thrombosis signs and symptoms(Stroke Metric)  Outcome: Progressing Towards Goal  Goal: *Ability to perform ADLs and demonstrates progressive mobility and function  Outcome: Progressing Towards Goal  Goal: *Stroke education started(Stroke Metric)  Outcome: Progressing Towards Goal  Goal: *Dysphagia screen performed(Stroke Metric)  Outcome: Progressing Towards Goal  Goal: *Rehab consulted(Stroke Metric)  Outcome: Progressing Towards Goal     Problem: TIA/CVA Stroke: Day 2 Until Discharge  Goal: Off Pathway (Use only if patient is Off Pathway)  Outcome: Progressing Towards Goal  Goal: Activity/Safety  Outcome: Progressing Towards Goal  Goal: Diagnostic Test/Procedures  Outcome: Progressing Towards Goal  Goal: Nutrition/Diet  Outcome: Progressing Towards Goal  Goal: Discharge Planning  Outcome: Progressing Towards Goal  Goal: Medications  Outcome: Progressing Towards Goal  Goal: Respiratory  Outcome: Progressing Towards Goal  Goal: Treatments/Interventions/Procedures  Outcome: Progressing Towards Goal  Goal: Psychosocial  Outcome: Progressing Towards Goal  Goal: *Verbalizes anxiety and depression are reduced or absent  Outcome: Progressing Towards Goal  Goal: *Absence of aspiration  Outcome: Progressing Towards Goal  Goal: *Absence of deep venous thrombosis signs and symptoms(Stroke Metric)  Outcome: Progressing Towards Goal  Goal: *Optimal pain control at patient's stated goal  Outcome: Progressing Towards Goal  Goal: *Tolerating diet  Outcome: Progressing Towards Goal  Goal: *Ability to perform ADLs and demonstrates progressive mobility and function  Outcome: Progressing Towards Goal  Goal: *Stroke education continued(Stroke Metric)  Outcome: Progressing Towards Goal     Problem: Ischemic Stroke: Discharge Outcomes  Goal: *Verbalizes anxiety and depression are reduced or absent  Outcome: Progressing Towards Goal  Goal: *Verbalize understanding of risk factor modification(Stroke Metric)  Outcome: Progressing Towards Goal  Goal: *Hemodynamically stable  Outcome: Progressing Towards Goal  Goal: *Absence of aspiration pneumonia  Outcome: Progressing Towards Goal  Goal: *Aware of needed dietary changes  Outcome: Progressing Towards Goal  Goal: *Verbalize understanding of prescribed medications including anti-coagulants, anti-lipid, and/or anti-platelets(Stroke Metric)  Outcome: Progressing Towards Goal  Goal: *Tolerating diet  Outcome: Progressing Towards Goal  Goal: *Aware of follow-up diagnostics related to anticoagulants  Outcome: Progressing Towards Goal  Goal: *Ability to perform ADLs and demonstrates progressive mobility and function  Outcome: Progressing Towards Goal  Goal: *Absence of DVT(Stroke Metric)  Outcome: Progressing Towards Goal  Goal: *Absence of aspiration  Outcome: Progressing Towards Goal  Goal: *Optimal pain control at patient's stated goal  Outcome: Progressing Towards Goal  Goal: *Home safety concerns addressed  Outcome: Progressing Towards Goal  Goal: *Describes available resources and support systems  Outcome: Progressing Towards Goal  Goal: *Verbalizes understanding of activation of [id number]) for stroke symptoms(Stroke Metric)  Outcome: Progressing Towards Goal  Goal: *Understands and describes signs and symptoms to report to providers(Stroke Metric)  Outcome: Progressing Towards Goal  Goal: *Neurolgocially stable (absence of additional neurological deficits)  Outcome: Progressing Towards Goal  Goal: *Verbalizes importance of follow-up with primary care physician(Stroke Metric)  Outcome: Progressing Towards Goal  Goal: *Smoking cessation discussed,if applicable(Stroke Metric)  Outcome: Progressing Towards Goal  Goal: *Depression screening completed(Stroke Metric)  Outcome: Progressing Towards Goal

## 2020-06-06 NOTE — ED TRIAGE NOTES
Pt states that he was out walking his dog when he noted that his right leg was weaker than normal.  He states it did create a gait disturbance. Pt has sensory disturbance to right arm and right leg. Pupils are equal and reactive at 4mm. He is awake and alert. He is able to answer questions and follow commands. He is moving all extremities well. Pt states he has a family hx of stroke. He has not been to a doctor in a long time. He does not have medical insurance.

## 2020-06-06 NOTE — PROGRESS NOTES
conducted an initial consultation and Spiritual Assessment for Perri Ayala, who is a 60 y.o.,male. Patients Primary Language is: [place]. According to the patients EMR Yazidism Affiliation is: No Muslim. The reason the Patient came to the hospital is:   Patient Active Problem List    Diagnosis Date Noted    CVA (cerebral vascular accident) ([facility] [address] 06/06/2020    Hyperglycemia 06/06/2020        The  provided the following Interventions:  Initiated a relationship of care and support. Explored issues of angelica, spirituality and/or Baptism needs while hospitalized. Listened empathically. Provided chaplaincy education. Provided information about Spiritual Care Services. Offered prayer and assurance of continued prayers on patient's behalf. Chart reviewed. The following outcomes were achieved:  Patient shared some information about their medical narrative and spiritual journey/beliefs. Patient processed feeling about current hospitalization. Patient expressed gratitude for the 's visit. Assessment:  Patient did not indicate any spiritual or Baptism issues which require Spiritual Care Services interventions at this time. Patient does not have any Baptism/cultural needs that will affect patients preferences in health care. Plan:  Chaplains will continue to follow and will provide pastoral care on an as needed or requested basis.  recommends bedside caregivers page  on duty if patient shows signs of acute spiritual or emotional distress.     [address] [facility]   Staff [address] [facility]   [phone]

## 2020-06-06 NOTE — PROGRESS NOTES
Speech Therapy:    Evaluation orders received. Upon completion of chart review, pt not appropriate for SLP evaluation this date. Currently, patient is:    [x] Tolerating current po diet (per RN report)  [] Tolerating current po diet; however, poor po intake (per RN report)   [] Receiving nutrition via tube feeding   [] Lethargic, solomnent, or difficult to arouse for safe po trials     [] Unable to manage secretions  [] Receiving intervention for respiratory distress  [] < 6 hours s/p extubation   [x] Other:  Passed dysphagia screener tool with no difficulty    Please contact SLP with questions/concerns. SLP will follow up next business day.     Thank you for this referral.    Manasa Lomas M.S. CCC-SLP/L  Speech-Language Pathologist

## 2020-06-06 NOTE — PROGRESS NOTES
0930: Pt arrived from [facility] with ER RN. Telephone report received previously. Dual NIH performed. Pt only complaining of mild tingling in right-sided extremities with slightly diminished sensation. Pt is AOX4.      1000:  Pt completed an advanced care plan with the . 1145: checked pts blood glucose and educated pt on taking his blood sugar, on the purpose of insulin and lantus (prior to giving first dose) and on the relation of hyperglycemia to stroke. Pt was very eager to learn. 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 Pt was very interested to learn about the affects of sugar on his blood vessels. States he has noticed increased thirst and frequency of urination over the past year as well as tingling and pain in his feet. He had suspected his increased sugar intake was a contributing factor but hadn't sought out a doctor. Pt states he's very interesting in learning more and doing everything he can to make himself healthier. Pt is also very interested in stroke rehab to help get rid of the numbness/tingling in his right sided extremities so that he can continue to work-out and do MMA. I gave pt the [facility] diabetes management booklet. 1200: Pt ate 100% of his lunch. 1340: Off the ICU to go to [facility]. Pt on tele with RN.     2106:  Returned to ICU. Pt tolerated well, VSS, no complaints. 1700:  Pt ate 100% of his dinner, tolerated well. VSS.     1800: Pt does not have a PCP and would like help obtaining one prior to discharge. Pt states he hasn't been to a doctor in over 20 years. 2300: Bedside and Verbal shift change report given to Mark Main RN   (oncoming nurse) by Francine Whitaker   (offgoing nurse). Report included the following information SBAR, Kardex, Intake/Output, MAR, Cardiac Rhythm NSR and Dual Neuro Assessment (NIH).

## 2020-06-06 NOTE — ED NOTES
0015  Patient in room 8, iv and labs being drawn, ekg performed, patent states he has numbing and tingling in right leg and arm. NIH = 1 with decreased sensation in right leg and right arm. No focal abnormalities. 0030  Was in CT scan, now back, Tele neurology Dr. Kelvin Cuellar is assessing this patient. 0126  NIH still at 1, no changes in sensation in right leg and arm. Activase is infusing. Patient has called family members to let them now he will remain in hospital overnight. 0141  Activase almost complete, NIH still at 1 with decreased sensation on right leg and right arm. States that in the last couple of minutes he feels that although he still has weakness on his right side those limbs do not feel as weak as they di when he first came in.     0235  Alteplase and normal saline infusion are complete. Patient states he has a headache, points to the back of his head, states it is a dull ache 1/10. Dr. Marcus Matos has been made aware. Patient aware of possible bleeding post alteplase thus he is to make nurses aware of any changes he experiences.       9501  Dr. Daquan Sheriff in to assess patient

## 2020-06-06 NOTE — PROGRESS NOTES
Problem: Mobility Impaired (Adult and Pediatric)  Goal: *Acute Goals and Plan of Care (Insert Text)  Description: Physical Therapy Goals  Initiated 6/6/2020 and to be accomplished within 7 day(s)  1. Patient will move from supine to sit and sit to supine  in bed with modified independence. 2.  Patient will transfer from bed to chair and chair to bed with modified independence using the least restrictive device. 3.  Patient will perform sit to stand with modified independence. 4.  Patient will ambulate with modified independence for 100 feet with the least restrictive device. Outcome: Progressing Towards Goal   PHYSICAL THERAPY EVALUATION    Patient: Sanchez Ross (9 y.o. male)  Date: 6/6/2020  Primary Diagnosis: CVA (cerebral vascular accident) [facility]) [I63.9]        Precautions:   Fall  PLOF: I    ASSESSMENT :  Based on the objective data described below, the patient presents with impaired standing balance and gait safety. Supervision for bed mobility and transfers but upon walking he demonstrate increased sway and moments of LOB. Increased sway during EC narrow stance. Increased unsteadiness when increasing right LE weight bearing. Strength and AROM WFL. Pt returned to bed with HOB elevated. .    Patient will benefit from skilled intervention to address the above impairments.   Patient's rehabilitation potential is considered to be Good  Factors which may influence rehabilitation potential include:   [x]         None noted  []         Mental ability/status  []         Medical condition  []         Home/family situation and support systems  []         Safety awareness  []         Pain tolerance/management  []         Other:      PLAN :  Recommendations and Planned Interventions:   [x]           Bed Mobility Training             [x]    Neuromuscular Re-Education  [x]           Transfer Training                   []    Orthotic/Prosthetic Training  [x]           Gait Training                          [] Modalities  [x]           Therapeutic Exercises           []    Edema Management/Control  [x]           Therapeutic Activities            []    Family Training/Education  [x]           Patient Education  []           Other (comment):    Frequency/Duration: Patient will be followed by physical therapy 3-5 times a week to address goals. Discharge Recommendations: Home Health and Outpatient  Further Equipment Recommendations for Discharge: straight cane and rolling walker     SUBJECTIVE:   Patient stated I was working out yesterday.     OBJECTIVE DATA SUMMARY:   No past medical history on file. No past surgical history on file. Barriers to Learning/Limitations: None  Compensate with: N/A  Home Situation:  Home Situation  Home Environment: Apartment  # Steps to Enter: 12  Rails to Enter: Yes  Hand Rails : Right  Living Alone: Yes  Patient Expects to be Discharged to[de-identified] Apartment  Current DME Used/Available at Home: None  Critical Behavior:  Neurologic State: Alert  Orientation Level: Oriented X4  Cognition: Follows commands     Psychosocial  Purposeful Interaction: Yes  Pt Identified Daily Priority: Clinical issues (comment)  [employer] Process: Attend basic human needs;Create healing environment  Caring Interventions: Reassure  Reassure: Caring rounds                 Strength:    Strength:  Within functional limits                    Tone & Sensation:   Tone: Normal              Sensation: Intact               Range Of Motion:  AROM: Within functional limits                       Posture:        Functional Mobility:  Bed Mobility:  Rolling: Supervision  Supine to Sit: Supervision  Sit to Supine: Supervision     Transfers:  Sit to Stand: Supervision  Stand to Sit: Supervision                       Balance:   Sitting: Impaired  Sitting - Static: Good (unsupported)  Sitting - Dynamic: Good (unsupported)  Standing: Impaired  Standing - Static: Good  Standing - Dynamic : Fair    Ambulation/Gait Training:  Distance (ft): 16 Feet (ft)     Ambulation - Level of Assistance: Stand-by assistance         Pain:  Pain level pre-treatment: 0/10   Pain level post-treatment: 0/10   Pain Intervention(s) : Medication (see MAR); Rest  Response to intervention: Nurse notified, See doc flow    Activity Tolerance:   Good  Please refer to the flowsheet for vital signs taken during this treatment. After treatment:   []         Patient left in no apparent distress sitting up in chair  [x]         Patient left in no apparent distress in bed  [x]         Call bell left within reach  [x]         Nursing notified  []         Caregiver present  []         Bed alarm activated  []         SCDs applied    COMMUNICATION/EDUCATION:   [x]         Role of Physical Therapy in the acute care setting. [x]         Fall prevention education was provided and the patient/caregiver indicated understanding. [x]         Patient/family have participated as able in goal setting and plan of care. []         Patient/family agree to work toward stated goals and plan of care. []         Patient understands intent and goals of therapy, but is neutral about his/her participation. []         Patient is unable to participate in goal setting/plan of care: ongoing with therapy staff.  []         Other:     Thank you for this referral.  Maureen Aguilar   Time Calculation: 12 mins      Eval Complexity: History: MEDIUM  Complexity : 1-2 comorbidities / personal factors will impact the outcome/ POC Exam:MEDIUM Complexity : 3 Standardized tests and measures addressing body structure, function, activity limitation and / or participation in recreation  Presentation: MEDIUM Complexity : Evolving with changing characteristics  Clinical Decision Making:Medium Complexity    Overall Complexity:MEDIUM

## 2020-06-06 NOTE — ED NOTES
Spoke with [facility] RN and informed that pt will go to MRI now and then will bring pt upstairs.

## 2020-06-06 NOTE — ACP (ADVANCE CARE PLANNING)
meet with Owen Ellis, who is a 60 y.o.,male to assist in the completion of a Bonaröd 15. Patients Primary Language is: SmartHub. The reason the Patient was admitted to is:   Patient Active Problem List    Diagnosis Date Noted    CVA (cerebral vascular accident) ([facility] [address] 06/06/2020    Hyperglycemia 06/06/2020        The  provided the following Interventions:  Chart reviewed. Initiated a relationship of care and support. Explored issues of capacity with the patient's Nurse (*) who confirmed  that  the Patient was not on any medications that would effect the patients ability to, nor would the patient's mental status effect the ability to execute an Advance Medical Directive. Questioned patient as to date, location, and name. Provided education on the Bonaröd 15. Offered prayer and assurance of continued prayers on patients behalf. Placed a copy of the executed Bonaröd 15 in the patients paper chart and returned the original to the Patient. The following outcomes were achieved:  Patient was able to state date, location, and name. Patient executed a Bonaröd 15, completing Section I:    Appointment and Ercharity Wolf of My [employer]  Patient executed Section II: My Health Care Instruction  Patient executed Section III: Anatomical Gifts. Patient expressed gratitude for pastoral care visit. Assessment:  There are no further spiritual or Scientologist issues which require Spiritual Care Services interventions at this time. Plan:  Chaplains will continue to follow and will provide pastoral care on an as needed/requested basis.  recommends bedside caregivers page  on duty if patient shows signs of acute spiritual or emotional distress.     [facility]   Staff [address]   [phone]

## 2020-06-07 ENCOUNTER — HOME HEALTH ADMISSION (OUTPATIENT)
Dept: HOME HEALTH SERVICES | Facility: HOME HEALTH | Age: 60
End: 2020-06-07

## 2020-06-07 ENCOUNTER — APPOINTMENT (OUTPATIENT)
Dept: CT IMAGING | Age: 60
DRG: 063 | End: 2020-06-07
Attending: HOSPITALIST

## 2020-06-07 VITALS
BODY MASS INDEX: 29.35 KG/M2 | DIASTOLIC BLOOD PRESSURE: 94 MMHG | WEIGHT: 205 LBS | RESPIRATION RATE: 22 BRPM | SYSTOLIC BLOOD PRESSURE: 144 MMHG | TEMPERATURE: 97.8 F | OXYGEN SATURATION: 100 % | HEIGHT: 70 IN | HEART RATE: 89 BPM

## 2020-06-07 PROBLEM — E11.9 NEWLY DIAGNOSED DIABETES (HCC): Status: ACTIVE | Noted: 2020-06-06

## 2020-06-07 LAB
ANION GAP SERPL CALC-SCNC: 6 MMOL/L (ref 3–18)
ATRIAL RATE: 91 BPM
BUN SERPL-MCNC: 9 MG/DL (ref 7–18)
BUN/CREAT SERPL: 11 (ref 12–20)
CALCIUM SERPL-MCNC: 9.1 MG/DL (ref 8.5–10.1)
CALCULATED P AXIS, ECG09: 51 DEGREES
CALCULATED R AXIS, ECG10: -18 DEGREES
CALCULATED T AXIS, ECG11: 29 DEGREES
CHLORIDE SERPL-SCNC: 102 MMOL/L (ref 100–111)
CHOLEST SERPL-MCNC: 180 MG/DL
CO2 SERPL-SCNC: 27 MMOL/L (ref 21–32)
COVID-19 RAPID TEST, COVR: NOT DETECTED
CREAT SERPL-MCNC: 0.83 MG/DL (ref 0.6–1.3)
DIAGNOSIS, 93000: NORMAL
ERYTHROCYTE [DISTWIDTH] IN BLOOD BY AUTOMATED COUNT: 12.1 % (ref 11.6–14.5)
GLUCOSE BLD STRIP.AUTO-MCNC: 170 MG/DL (ref 70–110)
GLUCOSE BLD STRIP.AUTO-MCNC: 187 MG/DL (ref 70–110)
GLUCOSE BLD STRIP.AUTO-MCNC: 241 MG/DL (ref 70–110)
GLUCOSE SERPL-MCNC: 171 MG/DL (ref 74–99)
HCT VFR BLD AUTO: 45.8 % (ref 36–48)
HDLC SERPL-MCNC: 37 MG/DL (ref 40–60)
HDLC SERPL: 4.9 {RATIO} (ref 0–5)
HGB BLD-MCNC: 15.7 G/DL (ref 13–16)
LDLC SERPL CALC-MCNC: 113.6 MG/DL (ref 0–100)
LIPID PROFILE,FLP: ABNORMAL
MCH RBC QN AUTO: 30.3 PG (ref 24–34)
MCHC RBC AUTO-ENTMCNC: 34.3 G/DL (ref 31–37)
MCV RBC AUTO: 88.4 FL (ref 74–97)
P-R INTERVAL, ECG05: 150 MS
PLATELET # BLD AUTO: 250 K/UL (ref 135–420)
PMV BLD AUTO: 9.4 FL (ref 9.2–11.8)
POTASSIUM SERPL-SCNC: 3.6 MMOL/L (ref 3.5–5.5)
Q-T INTERVAL, ECG07: 376 MS
QRS DURATION, ECG06: 98 MS
QTC CALCULATION (BEZET), ECG08: 462 MS
RBC # BLD AUTO: 5.18 M/UL (ref 4.7–5.5)
SODIUM SERPL-SCNC: 135 MMOL/L (ref 136–145)
SOURCE, COVRS: NORMAL
TRIGL SERPL-MCNC: 147 MG/DL (ref ?–150)
VENTRICULAR RATE, ECG03: 91 BPM
VLDLC SERPL CALC-MCNC: 29.4 MG/DL
WBC # BLD AUTO: 6 K/UL (ref 4.6–13.2)

## 2020-06-07 PROCEDURE — 74011250637 HC RX REV CODE- 250/637: Performed by: HOSPITALIST

## 2020-06-07 PROCEDURE — 87635 SARS-COV-2 COVID-19 AMP PRB: CPT

## 2020-06-07 PROCEDURE — 74011636637 HC RX REV CODE- 636/637: Performed by: HOSPITALIST

## 2020-06-07 PROCEDURE — 80061 LIPID PANEL: CPT

## 2020-06-07 PROCEDURE — 90686 IIV4 VACC NO PRSV 0.5 ML IM: CPT | Performed by: HOSPITALIST

## 2020-06-07 PROCEDURE — 90471 IMMUNIZATION ADMIN: CPT

## 2020-06-07 PROCEDURE — 80048 BASIC METABOLIC PNL TOTAL CA: CPT

## 2020-06-07 PROCEDURE — 74011250636 HC RX REV CODE- 250/636: Performed by: HOSPITALIST

## 2020-06-07 PROCEDURE — 92610 EVALUATE SWALLOWING FUNCTION: CPT

## 2020-06-07 PROCEDURE — 70450 CT HEAD/BRAIN W/O DYE: CPT

## 2020-06-07 PROCEDURE — 82962 GLUCOSE BLOOD TEST: CPT

## 2020-06-07 PROCEDURE — 85027 COMPLETE CBC AUTOMATED: CPT

## 2020-06-07 RX ORDER — INSULIN GLARGINE 100 [IU]/ML
24 INJECTION, SOLUTION SUBCUTANEOUS DAILY
Qty: 1 VIAL | Refills: 0 | Status: SHIPPED | OUTPATIENT
Start: 2020-06-07 | End: 2020-06-07

## 2020-06-07 RX ORDER — ATORVASTATIN CALCIUM 80 MG/1
80 TABLET, FILM COATED ORAL
Qty: 30 TAB | Refills: 0 | Status: SHIPPED | OUTPATIENT
Start: 2020-06-07

## 2020-06-07 RX ORDER — METFORMIN HYDROCHLORIDE 500 MG/1
500 TABLET ORAL 2 TIMES DAILY WITH MEALS
Qty: 60 TAB | Refills: 0 | Status: SHIPPED | OUTPATIENT
Start: 2020-06-07

## 2020-06-07 RX ORDER — ASPIRIN 325 MG
325 TABLET ORAL DAILY
Qty: 30 TAB | Refills: 0 | Status: SHIPPED | OUTPATIENT
Start: 2020-06-08

## 2020-06-07 RX ORDER — NAPHAZOLINE HYDROCHLORIDE AND POLYETHYLENE GLYCOL 300 .1; 2 MG/ML; MG/ML
1 SOLUTION/ DROPS OPHTHALMIC
Qty: 100 SYRINGE | Refills: 0 | Status: SHIPPED | OUTPATIENT
Start: 2020-06-07

## 2020-06-07 RX ADMIN — INSULIN LISPRO 3 UNITS: 100 INJECTION, SOLUTION INTRAVENOUS; SUBCUTANEOUS at 07:03

## 2020-06-07 RX ADMIN — ASPIRIN 325 MG ORAL TABLET 325 MG: 325 PILL ORAL at 08:40

## 2020-06-07 RX ADMIN — INSULIN LISPRO 6 UNITS: 100 INJECTION, SOLUTION INTRAVENOUS; SUBCUTANEOUS at 11:38

## 2020-06-07 RX ADMIN — ENOXAPARIN SODIUM 40 MG: 40 INJECTION SUBCUTANEOUS at 08:40

## 2020-06-07 RX ADMIN — INSULIN GLARGINE 24 UNITS: 100 INJECTION, SOLUTION SUBCUTANEOUS at 08:40

## 2020-06-07 RX ADMIN — INFLUENZA VIRUS VACCINE 0.5 ML: 15; 15; 15; 15 SUSPENSION INTRAMUSCULAR at 11:20

## 2020-06-07 RX ADMIN — Medication 10 ML: at 05:20

## 2020-06-07 NOTE — PROGRESS NOTES
Problem: Diabetes Self-Management  Goal: *Disease process and treatment process  Description: Define diabetes and identify own type of diabetes; list 3 options for treating diabetes. Outcome: Progressing Towards Goal  Goal: *Incorporating nutritional management into lifestyle  Description: Describe effect of type, amount and timing of food on blood glucose; list 3 methods for planning meals. Outcome: Progressing Towards Goal  Goal: *Incorporating physical activity into lifestyle  Description: State effect of exercise on blood glucose levels. Outcome: Progressing Towards Goal  Goal: *Developing strategies to promote health/change behavior  Description: Define the ABC's of diabetes; identify appropriate screenings, schedule and personal plan for screenings. Outcome: Progressing Towards Goal  Goal: *Using medications safely  Description: State effect of diabetes medications on diabetes; name diabetes medication taking, action and side effects. Outcome: Progressing Towards Goal  Goal: *Monitoring blood glucose, interpreting and using results  Description: Identify recommended blood glucose targets  and personal targets. Outcome: Progressing Towards Goal  Goal: *Prevention, detection, treatment of acute complications  Description: List symptoms of hyper- and hypoglycemia; describe how to treat low blood sugar and actions for lowering  high blood glucose level. Outcome: Progressing Towards Goal  Goal: *Prevention, detection and treatment of chronic complications  Description: Define the natural course of diabetes and describe the relationship of blood glucose levels to long term complications of diabetes.   Outcome: Progressing Towards Goal  Goal: *Developing strategies to address psychosocial issues  Description: Describe feelings about living with diabetes; identify support needed and support network  Outcome: Progressing Towards Goal  Goal: *Insulin pump training  Outcome: Progressing Towards Goal  Goal: *Sick day guidelines  Outcome: Progressing Towards Goal  Goal: *Patient Specific Goal (EDIT GOAL, INSERT TEXT)  Outcome: Progressing Towards Goal     Problem: Patient Education: Go to Patient Education Activity  Goal: Patient/Family Education  Outcome: Progressing Towards Goal     Problem: Falls - Risk of  Goal: *Absence of Falls  Description: Document Heike Lauren Fall Risk and appropriate interventions in the flowsheet. Outcome: Progressing Towards Goal  Note: Fall Risk Interventions:  Mobility Interventions: Bed/chair exit alarm         Medication Interventions: Bed/chair exit alarm, Patient to call before getting OOB                   Problem: Patient Education: Go to Patient Education Activity  Goal: Patient/Family Education  Outcome: Progressing Towards Goal     Problem: Pressure Injury - Risk of  Goal: *Prevention of pressure injury  Description: Document Cristobal Scale and appropriate interventions in the flowsheet.   Outcome: Progressing Towards Goal  Note: Pressure Injury Interventions:  Sensory Interventions: Assess changes in LOC, Keep linens dry and wrinkle-free    Moisture Interventions: Offer toileting Q_hr    Activity Interventions: PT/OT evaluation         Nutrition Interventions: Discuss nutritional consult with provider                     Problem: Patient Education: Go to Patient Education Activity  Goal: Patient/Family Education  Outcome: Progressing Towards Goal     Problem: Patient Education: Go to Patient Education Activity  Goal: Patient/Family Education  Outcome: Progressing Towards Goal     Problem: TIA/CVA Stroke: 0-24 hours  Goal: Off Pathway (Use only if patient is Off Pathway)  Outcome: Progressing Towards Goal  Goal: Activity/Safety  Outcome: Progressing Towards Goal  Goal: Consults, if ordered  Outcome: Progressing Towards Goal  Goal: Diagnostic Test/Procedures  Outcome: Progressing Towards Goal  Goal: Nutrition/Diet  Outcome: Progressing Towards Goal  Goal: Discharge Planning  Outcome: Progressing Towards Goal  Goal: Medications  Outcome: Progressing Towards Goal  Goal: Respiratory  Outcome: Progressing Towards Goal  Goal: Treatments/Interventions/Procedures  Outcome: Progressing Towards Goal  Goal: Minimize risk of bleeding post-thrombolytic infusion  Outcome: Progressing Towards Goal  Goal: Monitor for complications post-thrombolytic infusion  Outcome: Progressing Towards Goal  Goal: Psychosocial  Outcome: Progressing Towards Goal  Goal: *Hemodynamically stable  Outcome: Progressing Towards Goal  Goal: *Neurologically stable  Description: Absence of additional neurological deficits    Outcome: Progressing Towards Goal  Goal: *Verbalizes anxiety and depression are reduced or absent  Outcome: Progressing Towards Goal  Goal: *Absence of Signs of Aspiration on Current Diet  Outcome: Progressing Towards Goal  Goal: *Absence of deep venous thrombosis signs and symptoms(Stroke Metric)  Outcome: Progressing Towards Goal  Goal: *Ability to perform ADLs and demonstrates progressive mobility and function  Outcome: Progressing Towards Goal  Goal: *Stroke education started(Stroke Metric)  Outcome: Progressing Towards Goal  Goal: *Dysphagia screen performed(Stroke Metric)  Outcome: Progressing Towards Goal  Goal: *Rehab consulted(Stroke Metric)  Outcome: Progressing Towards Goal     Problem: TIA/CVA Stroke: Day 2 Until Discharge  Goal: Off Pathway (Use only if patient is Off Pathway)  Outcome: Progressing Towards Goal  Goal: Activity/Safety  Outcome: Progressing Towards Goal  Goal: Diagnostic Test/Procedures  Outcome: Progressing Towards Goal  Goal: Nutrition/Diet  Outcome: Progressing Towards Goal  Goal: Discharge Planning  Outcome: Progressing Towards Goal  Goal: Medications  Outcome: Progressing Towards Goal  Goal: Respiratory  Outcome: Progressing Towards Goal  Goal: Treatments/Interventions/Procedures  Outcome: Progressing Towards Goal  Goal: Psychosocial  Outcome: Progressing Towards Goal  Goal: *Verbalizes anxiety and depression are reduced or absent  Outcome: Progressing Towards Goal  Goal: *Absence of aspiration  Outcome: Progressing Towards Goal  Goal: *Absence of deep venous thrombosis signs and symptoms(Stroke Metric)  Outcome: Progressing Towards Goal  Goal: *Optimal pain control at patient's stated goal  Outcome: Progressing Towards Goal  Goal: *Tolerating diet  Outcome: Progressing Towards Goal  Goal: *Ability to perform ADLs and demonstrates progressive mobility and function  Outcome: Progressing Towards Goal  Goal: *Stroke education continued(Stroke Metric)  Outcome: Progressing Towards Goal     Problem: Ischemic Stroke: Discharge Outcomes  Goal: *Verbalizes anxiety and depression are reduced or absent  Outcome: Progressing Towards Goal  Goal: *Verbalize understanding of risk factor modification(Stroke Metric)  Outcome: Progressing Towards Goal  Goal: *Hemodynamically stable  Outcome: Progressing Towards Goal  Goal: *Absence of aspiration pneumonia  Outcome: Progressing Towards Goal  Goal: *Aware of needed dietary changes  Outcome: Progressing Towards Goal  Goal: *Verbalize understanding of prescribed medications including anti-coagulants, anti-lipid, and/or anti-platelets(Stroke Metric)  Outcome: Progressing Towards Goal  Goal: *Tolerating diet  Outcome: Progressing Towards Goal  Goal: *Aware of follow-up diagnostics related to anticoagulants  Outcome: Progressing Towards Goal  Goal: *Ability to perform ADLs and demonstrates progressive mobility and function  Outcome: Progressing Towards Goal  Goal: *Absence of DVT(Stroke Metric)  Outcome: Progressing Towards Goal  Goal: *Absence of aspiration  Outcome: Progressing Towards Goal  Goal: *Optimal pain control at patient's stated goal  Outcome: Progressing Towards Goal  Goal: *Home safety concerns addressed  Outcome: Progressing Towards Goal  Goal: *Describes available resources and support systems  Outcome: Progressing Towards Goal  Goal: *Verbalizes understanding of activation of [id number]) for stroke symptoms(Stroke Metric)  Outcome: Progressing Towards Goal  Goal: *Understands and describes signs and symptoms to report to providers(Stroke Metric)  Outcome: Progressing Towards Goal  Goal: *Neurolgocially stable (absence of additional neurological deficits)  Outcome: Progressing Towards Goal  Goal: *Verbalizes importance of follow-up with primary care physician(Stroke Metric)  Outcome: Progressing Towards Goal  Goal: *Smoking cessation discussed,if applicable(Stroke Metric)  Outcome: Progressing Towards Goal  Goal: *Depression screening completed(Stroke Metric)  Outcome: Progressing Towards Goal

## 2020-06-07 NOTE — PROGRESS NOTES
0700: Bedside shift change report given to [address] [place] [address] Marcelle (oncoming nurse) by Azucena Ferro RN (offgoing nurse). Report included the following information SBAR, MAR and Recent Results. Dual NIH performed with Azucena Ferro RN. No neuro changes overnight. NIH 1-sensory on the right side.

## 2020-06-07 NOTE — PROGRESS NOTES
2300: assumed care from [facility], Dual NIH completed. Pt alert and oriented x4, pt continuing to complain of the rt sided numbness and tingling, RLE worst than than RUE. CT called to confirm 24hr CT, Neo Otto will call closer to the time. 0000: Assessment completed, NIH completed, no changes    0040: confirmed with CT to plan for CT at   0130, as TPA was completed at 0157 yesterday. 0100:NIH completed, no changes    0130: Transported to CT in wheelchair w/ monitor and RN    0147: Returned to unit, pt sitting in chair bathing himself, linens changed. 0200: NIH completed, no changes noted    0400: Reassessment completed, no changes noted. Labs drawn and NIH completed. 0700:Bedside and Verbal shift change report given to Elier Edward (oncoming nurse) by Alireza Flores  (offgoing nurse). Report included the following information SBAR, Kardex, ED Summary, Intake/Output, MAR, Accordion, Recent Results and Dual Neuro Assessment. Dual NIH completed at the bedside.

## 2020-06-07 NOTE — DISCHARGE SUMMARY
2 [facility]  Hospitalist Division    Discharge Summary    Patient: Ines Fothergill MRN: [id number]  [facility]: [id number]    YOB: 1960  Age: 60 y.o. Sex: male    DOA: 6/6/2020 LOS:  LOS: 1 day   Discharge Date:      Admission Diagnoses: CVA (cerebral vascular accident) [facility]) [I63.9]    Discharge Diagnoses:  Principal Problem:    CVA (cerebral vascular accident) ([facility] [address] (6/6/2020)    Active Problems:    [facility] (6/6/2020)        Discharge Condition: Stable    Discharge To: Home    Consults: Neurology    HPI: Ines Fothergill is a 60y.o. year old male who presents with Sudden onset R sided numbness that started a couple hrs before presentation. Numbness present neck to leg or right side. At that time he was walking his dog and besides the numbness noted some weakness on R leg which prompted him to come to ED. There is a + FHx of CVA. The patient also reports 1 month history of numbness/tingling of BLE and states he drinks about 8 sodas/day for the past year. The ed provider on arrival did note ataxia due to weakness of the R leg. On my exam weakness appears improved but numbness still present. Hospital Course:     CVA  MRA of head/neck negative. MRI brain revealed acute left thalamic lacunar infarct. Mild numbness remains in RUE. 24hr post tpa was neg for bleed. He was started on asa and statin. COVID test was done to r/o contribution to prothrombotic state, but it was negative. Diabetes  A1c 11.3%, . DM mgmt consulted. He does not have insurance. He will be discharged on 70/30 and metformin. He was given Rx for lancets, test strips, and syringes. He was instructed on how to draw and administer insulin prior to discharge.       Physical Exam:  General appearance: alert, cooperative, no distress, appears stated age  Head: Normocephalic, without obvious abnormality, atraumatic  Lungs: clear to auscultation bilaterally  Heart: regular rate and rhythm, S1, S2 normal, no murmur, click, rub or gallop  Extremities: no cyanosis or edema  Skin: Skin color, texture normal. No rashes or lesions  Neurologic: no focal deficits, 5/5 strength BUE/BLE. Subjective numbness to RUE  PSY: Mood and affect normal, appropriately behaved    Significant Diagnostic Studies:     BMP:   Lab Results   Component Value Date/Time     (L) 06/07/2020 03:35 AM    K 3.6 06/07/2020 03:35 AM     06/07/2020 03:35 AM    CO2 27 06/07/2020 03:35 AM    AGAP 6 06/07/2020 03:35 AM     (H) 06/07/2020 03:35 AM    BUN 9 06/07/2020 03:35 AM    CREA 0.83 06/07/2020 03:35 AM    GFRAA >60 06/07/2020 03:35 AM    GFRNA >60 06/07/2020 03:35 AM     CBC:   Lab Results   Component Value Date/Time    WBC 6.0 06/07/2020 03:35 AM    HGB 15.7 06/07/2020 03:35 AM    HCT 45.8 06/07/2020 03:35 AM     06/07/2020 03:35 AM       Mra Brain Wo Cont    Addendum Date: 6/6/2020    Addendum: INDICATION:  Strokelike symptoms. Right-sided numbness. Right leg weakness. Ataxia. Result Date: 6/6/2020  EXAM: Magnetic resonance arteriogram of the brain and neck. INDICATION:  COMPARISON:  No prior CTA or MRA available for comparison. TECHNIQUE: MRA of the brain consists of noncontrast 3D time-of-flight imaging from the skull base to the superior margin of the lateral ventricles with 3D MIP reformations. The MRA of the neck consists of noncontrast 2-D time-of-flight imaging of the whole neck with 3-D reformations, and contrast-enhanced coronal source imaging with subtraction 3-D reformations (from the precontrast and postcontrast data set). Percent stenoses are reported with reference to the downstream lumen (\"NASCET\"). _______________ FINDINGS:      MRA NECK IMAGE QUALITY:  The exam is overall moderately degraded by motion artifact. VASCULAR TORTUOSITY:  Low-grade. AORTIC ARCH VESSELS:  Conventional three vessel anatomy.   There is no apparent significant innominate or subclavian artery stenosis. RIGHT CAROTID ARTERY:           CCA:  No stenosis. ICA (extracranial): Widely patent. 0% stenosis. Intracranial ICA:  Widely patent. LEFT CAROTID ARTERY:              CCA:  Widely patent. ICA (extracranial): Widely patent. 0% stenosis. Intracranial ICA:  Widely patent. VERTEBRAL ARTERIES AND PICAs:           VA dominance:  Left. Right VA:  Small caliber, not well characterized due to motion artifact but is fully opacified and without occlusion. Left VA:  Widely patent. PICAs:  Opacified bilaterally. BASILAR AND CEREBRAL ARTERIES:            3D time-of-flight image quality:  The exam is overall mildly degraded by motion artifact. ANEURYSMS: No saccular aneurysm is detected. RMCA:  Unremarkable. RACA:   Unremarkable. LMCA:   Unremarkable. LACA:   Unremarkable. Basilar artery:   Unremarkable. RPCA:   Unremarkable. LPCA:   Unremarkable. COW anatomy summary:                 RACA A1:  Intermediate caliber. LACA A1:  Large caliber. RpComm:  Intermediate caliber. LpComm:  Hypoplastic/absent. RPCA P1:  Hypoplastic. LPCA P1:  Intermediate caliber. _______________     IMPRESSION:    MRA NECK: Negative. MRA BRAIN: Negative. _______________     Dane Tavarez Neck W Wo Cont    Addendum Date: 6/6/2020    Addendum: INDICATION:  Strokelike symptoms. Right-sided numbness. Right leg weakness. Ataxia. Result Date: 6/6/2020  EXAM: Magnetic resonance arteriogram of the brain and neck. INDICATION:  COMPARISON:  No prior CTA or MRA available for comparison.  TECHNIQUE: MRA of the brain consists of noncontrast 3D time-of-flight imaging from the skull base to the superior margin of the lateral ventricles with 3D MIP reformations. The MRA of the neck consists of noncontrast 2-D time-of-flight imaging of the whole neck with 3-D reformations, and contrast-enhanced coronal source imaging with subtraction 3-D reformations (from the precontrast and postcontrast data set). Percent stenoses are reported with reference to the downstream lumen (\"NASCET\"). _______________ FINDINGS:      MRA NECK IMAGE QUALITY:  The exam is overall moderately degraded by motion artifact. VASCULAR TORTUOSITY:  Low-grade. AORTIC ARCH VESSELS:  Conventional three vessel anatomy. There is no apparent significant innominate or subclavian artery stenosis. RIGHT CAROTID ARTERY:           CCA:  No stenosis. ICA (extracranial): Widely patent. 0% stenosis. Intracranial ICA:  Widely patent. LEFT CAROTID ARTERY:              CCA:  Widely patent. ICA (extracranial): Widely patent. 0% stenosis. Intracranial ICA:  Widely patent. VERTEBRAL ARTERIES AND PICAs:           VA dominance:  Left. Right VA:  Small caliber, not well characterized due to motion artifact but is fully opacified and without occlusion. Left VA:  Widely patent. PICAs:  Opacified bilaterally. BASILAR AND CEREBRAL ARTERIES:            3D time-of-flight image quality:  The exam is overall mildly degraded by motion artifact. ANEURYSMS: No saccular aneurysm is detected. RMCA:  Unremarkable. RACA:   Unremarkable. LMCA:   Unremarkable. LACA:   Unremarkable. Basilar artery:   Unremarkable. RPCA:   Unremarkable. LPCA:   Unremarkable. COW anatomy summary:                 RACA A1:  Intermediate caliber. LACA A1:  Large caliber. RpComm:  Intermediate caliber. LpComm:  Hypoplastic/absent. RPCA P1:  Hypoplastic. LPCA P1:  Intermediate caliber. _______________     IMPRESSION:    MRA NECK: Negative. MRA BRAIN: Negative. _______________     Roberta Carrizales Wo Cont    Result Date: 6/6/2020  EXAM: MRI of the brain without intravenous contrast. INDICATION:  Strokelike symptoms. Right-sided numbness. Right leg weakness. Ataxia. COMPARISON:  No prior MRI is available for direct comparison. CORRELATION (related prior exam):  Recent CT. PROTOCOL:  Routine brain. _______________ FINDINGS:       IMAGE QUALITY:  Diagnostic. BRAIN AND EXTRA-AXIAL SPACE:           ACUTE/SUBACUTE INFARCT:  15 x 8 mm focus in the left thalamus. MASS:  None. HEMORRHAGE:  None. SUBDURAL FLUID COLLECTION:  None. HYDROCEPHALUS:  None. ICA AND DOMINANT VA T2 FLOW VOIDS:  Unremarkable. REMOTE CEREBRAL NON-LACUNAR INFARCT:  None definite. REMOTE CEREBELLAR INFARCT:  None definite. 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 The cisterna magna is developmentally enlarged, which is a nonpathologic anatomic variant. STRIVE (STandards for Reporting Vascular changes on nEuroimaging):                            --[place] of white matter hyperintensity (\"leukoaraiosis\") of presumed vascular origin:  None significant. --[place] of chronic lacunes of presumed vascular origin:  None by 3 mm STRIVE criteria. --[place] of perivascular spaces:  None significant. --[place] of \"microbleeds\":   None definite. --Degree of brain atrophy:  None significant. SELLA/PITUITARY:  Unremarkable. HEENT:            ORBITS:  Unremarkable. PARANASAL SINUSES:  Predominantly clear. MASTOID AIR CELLS:  Predominantly clear. INCLUDED UPPER CERVICAL LYMPH NODES:  Unremarkable.            INCLUDED UPPER PAROTIDS: Unremarkable. NASOPHARYNX:  Unremarkable. BACKGROUND BONE MARROW SIGNAL:  Unremarkable. SCALP:  Unremarkable. _______________     IMPRESSION: Acute/subacute left thalamic lacunar infarct. _______________     Ct Head Wo Cont    Result Date: 6/7/2020  EXAM: CT of the brain without intravenous contrast. INDICATION:  \"24 hour TPA follow up. \" COMPARISON:  CT yesterday. DOSE REDUCTION AND DICOM INFORMATION: One or more dose reduction techniques were used on this CT: automated exposure control, adjustment of the mAs and/or kVp according to patient size, and iterative reconstruction techniques. The specific techniques used on this CT exam have been documented in the patient's electronic medical record. Digital Imaging and Communications in Medicine (DICOM) format image data are available to nonaffiliated external healthcare facilities or entities on a secure, media free, reciprocally searchable basis with patient authorization for at least a 12-month period after this study. _______________ FINDINGS:      IMAGE QUALITY:  Diagnostic. BRAIN AND EXTRA-AXIAL SPACE:            SUBACUTE TERRITORIAL TRANSCORTICAL INFARCT:  A small area of subtle low attenuation in the left thalamus correlates with the left thalamic acute/subacute lacunar infarct shown on the MRI yesterday. There is no hemorrhage or mass effect. MASS:  None detected. HEMORRHAGE:  None. SUBDURAL FLUID COLLECTION:  None detected. HYDROCEPHALUS:  None. REMOTE  TERRITORIAL CEREBRAL INFARCT:  None detected. REMOTE CEREBELLAR INFARCT:  None detected. STRIVE (STandards for Reporting Vascular changes on nEuroimaging):                            --[place] of white matter hypodensity of presumed vascular origin:  None significant. --[place] of lacunes of presumed vascular origin: None definite.                              --Degree of brain atrophy:  None significant. BURDEN OF CALCIFIC INTRACRANIAL ATHEROSCLEROSIS:   Low-grade. HEENT:            INCLUDED UPPER ORBITS:  Unremarkable. INCLUDED UPPER PARANASAL SINUSES:  Predominantly clear. MASTOID AIR CELLS:  Predominantly clear. BONES:  Unremarkable. SCALP:  Unremarkable. _______________     IMPRESSION: Negative for hemorrhage post TPA. Redemonstrated acute/subacute left thalamic lacunar infarct. _______________     Ct Head Wo Cont    Result Date: 6/6/2020  EXAM: CT head INDICATION: CODE S. Right-sided numbness. COMPARISON: None. TECHNIQUE: Axial CT imaging of the head was performed without intravenous contrast. One or more dose reduction techniques were used on this CT: automated exposure control, adjustment of the mAs and/or kVp according to patient size, and iterative reconstruction techniques. The specific techniques used on this CT exam have been documented in the patient's electronic medical record. Digital Imaging and Communications in the Medicine (DICOM) format image data are available to nonaffiliated external healthcare facilities or entities on a secure, media free, reciprocally searchable basis with patient authorization for at least a 12 month period after this study. _______________ FINDINGS: BRAIN AND POSTERIOR FOSSA: The sulci, folia, ventricles and basal cisterns are within normal limits for the patient's age. There is no intracranial hemorrhage, mass effect, or midline shift. There are no areas of abnormal parenchymal attenuation. Cerebral atherosclerosis noted. EXTRA-AXIAL SPACES AND MENINGES: There are no abnormal extra-axial fluid collections. CALVARIUM: Intact. SINUSES: Clear. OTHER: None. _______________     IMPRESSION: No acute intracranial abnormalities.  Critical, CODE S findings discussed with Dr. Amon Callas in the emergency department during initial examination review 12:36 PM.    Ct Spine Cerv Wo Cont    Result Date: 6/6/2020  EXAM: CT cervical spine INDICATION: Right heminumbness. COMPARISON: None. TECHNIQUE: Axial CT imaging of the cervical spine was performed from the skull base to the thoracic inlet without intravenous contrast. Multiplanar reformats were generated. One or more dose reduction techniques were used on this CT: automated exposure control, adjustment of the mAs and/or kVp according to patient size, and iterative reconstruction techniques. The specific techniques used on this CT exam have been documented in the patient's electronic medical record. Digital Imaging and Communications in the Medicine (DICOM) format image data are available to nonaffiliated external healthcare facilities or entities on a secure, media free, reciprocally searchable basis with patient authorization for at least a 12 month period after this study. _______________ FINDINGS: VERTEBRAE AND DISCS: No cervical spine fracture or acute appearing malalignment. There is mild degenerative disc space narrowing at C6-7 with trace associated retrolisthesis. Mild hypertrophic degenerative changes are present. SPINAL CANAL AND FORAMINA: Central canal appears adequate. There is mild neural foraminal narrowing on the left at C6-7. PREVERTEBRAL SOFT TISSUES: No hematoma. OTHER: None. _______________     IMPRESSION: No cervical spine fracture or acute malalignment. Xr Chest Port    Result Date: 6/6/2020  EXAM: CHEST RADIOGRAPH CLINICAL INDICATION/HISTORY: Stroke.   > Additional: None COMPARISON: None TECHNIQUE: Portable frontal view of the chest _______________ FINDINGS: SUPPORT DEVICES: None. HEART AND MEDIASTINUM: No appreciable cardiomegaly. Remaining mediastinal contours within normal limits. LUNGS AND PLEURAL SPACES: Clear. No consolidation, mass or effusion. BONY THORAX AND SOFT TISSUES: Unremarkable. _______________     IMPRESSION: No active cardiopulmonary disease.       Procedures: None    Discharge Medications:       Current Discharge Medication List START taking these medications    Details   aspirin (ASPIRIN) 325 mg tablet Take 1 Tab by mouth daily. Qty: 30 Tab, Refills: 0      atorvastatin (LIPITOR) 80 mg tablet Take 1 Tab by mouth nightly. Qty: 30 Tab, Refills: 0      metFORMIN (GLUCOPHAGE) 500 mg tablet Take 1 Tab by mouth two (2) times daily (with meals). Qty: 60 Tab, Refills: 0      lancets-blood glucose strips 30 gauge cmpk Use with blood glucose monitor  Qty: 1 Dose Pack, Refills: 0      Insulin Syringe-Needle U-100 (Insulin Syringe) 0.5 mL 29 gauge x 1/2\" syrg 1 Syringe by Does Not Apply route ACB/HS. Qty: 100 Syringe, Refills: 0      insulin NPH/insulin regular (NovoLIN 70/30 U-100 Insulin) 100 unit/mL (70-30) injection 18 Units by SubCUTAneous route Before breakfast and dinner.   Qty: 10 mL, Refills: 1             Activity: Activity as tolerated    Diet: Diabetic Diet    Wound Care: None needed    Follow-up: 1 week with PCP, 2-4 weeks with neurology    Discharge time: >35 minutes    [facility]  Office:  [phone]  Pager: [phone]      6/7/2020, 11:57 AM

## 2020-06-07 NOTE — PROGRESS NOTES
Problem: Dysphagia (Adult)  Goal: *Acute Goals and Plan of Care (Insert Text)  Description: Patient will:  1. Tolerate PO trials with 0 s/s overt distress in 4/5 trials  2. Utilize compensatory swallow strategies/maneuvers (decrease bite/sip, size/rate, alt. liq/sol) with min cues in 4/5 trials    Rec:     Reg solid with thin liquids  Aspiration precautions  HOB >45 during po intake, remain >30 for 30-45 minutes after po   Small bites/sips; alternate liquid/solid with slow feeding rate   Oral care TID  Meds per pt preference     Outcome: Progressing Towards Goal     SPEECH LANGUAGE PATHOLOGY BEDSIDE SWALLOW EVALUATION/TREATMENT    Patient: Ute Tamez (50 y.o. male)  Date: 6/7/2020  Primary Diagnosis: CVA (cerebral vascular accident) [facility]) [I63.9]        Precautions: apsiration  Fall  PLOF: As per H&P    ASSESSMENT :  Based on the objective data described below, the patient presents with oropharyngeal swallow fxn essentially [facility]. Strength, ROM, and coordination of the orofacial musculature were [facility]. All structures were intact and symmetrical. The pt presented with adequate oral transit times on all consistencies. Mastication time was appropriate. No s/sx of aspiration noted; hyolaryngeal elevation and excursion appeared adequate on all consistencies. No oral stasis noted post swallow. The pt denied globus sensation post swallow. Speech production was fluent. No dysarthria was observed. Expressive/receptive speech production appeared WFL to informal observation. Pt communicated in sentences of appropriate form, content, and use. Rec reg solid diet with thin liquids, aspiration precautions, oral care TID, and meds as tolerated. ST will continue to follow x 1-2 visits to ensure safety of diet tolerance. Patient will benefit from skilled intervention to address the above impairments.   Patient's rehabilitation potential is considered to be Good  Factors which may influence rehabilitation potential include:   [x]            None noted  []            Mental ability/status  []            Medical condition  []            Home/family situation and support systems  []            Safety awareness  []            Pain tolerance/management  []            Other:      PLAN :  Recommendations and Planned Interventions: See above  Frequency/Duration: Patient will be followed by speech-language pathology x 1-2 more visits to address goals. Discharge Recommendations: None     SUBJECTIVE:   Patient stated It just really scares me that this happened. I'm a pretty healthy eder. OBJECTIVE:   No past medical history on file. No past surgical history on file. Prior Level of Function/Home Situation: see below  Home Situation  Home Environment: Apartment  # Steps to Enter: 12  Rails to Enter: Yes  Hand Rails : Right  One/Two Story Residence: One story  Living Alone: Yes  Support Systems: Family member(s)  Patient Expects to be Discharged to[de-identified] Apartment  Current DME Used/Available at Home: None    Diet prior to admission: reg solid with thin  Current Diet:  reg solid with thin      Cognitive and Communication Status:  Neurologic State: Alert  Orientation Level: Oriented X4  Cognition: Follows commands  Oral Assessment:  Oral Assessment  Labial: No impairment  Dentition: Natural;Intact  Oral Hygiene: adequate  Lingual: No impairment  Velum: No impairment  Mandible: No impairment  P.O. Trials:  Patient Position: [facility]  Vocal quality prior to P.O.: No impairment  Consistency Presented: Thin liquid; Solid  How Presented: Self-fed/presented;Straw  Bolus Acceptance: No impairment  Bolus Formation/Control: No impairment  Propulsion: No impairment  Oral Residue: None  Initiation of Swallow: No impairment  Laryngeal Elevation: Functional  Aspiration Signs/Symptoms: None  Pharyngeal Phase Characteristics: No impairment, issues, or problems   Effective Modifications: None  Cues for Modifications: None     Oral Phase Severity: No impairment  Pharyngeal Phase Severity : No impairment    PAIN:  Start of Eval: 0  End of Eval: 0     After treatment:   []            Patient left in no apparent distress sitting up in chair  [x]            Patient left in no apparent distress in bed  [x]            Call bell left within reach  [x]            Nursing notified  []            Family present  []            Caregiver present  []            Bed alarm activated    COMMUNICATION/EDUCATION:   [x]            Aspiration precautions; swallow safety; compensatory techniques. [x]            Patient/family have participated as able in goal setting and plan of care.     Thank you for this referral.    Tori Peck M.S. CCC-SLP/L  Speech-Language Pathologist

## 2020-06-07 NOTE — DIABETES MGMT
Nutrition and Glycemic Control: Diabetes consult received, assessment and education to follow.   Yas GLEZ

## 2020-06-07 NOTE — PROGRESS NOTES
Problem: Discharge Planning  Goal: *Discharge to safe environment  Outcome:  Resolved/Met    Home health for [facility]    Noted orders for discharge with home health [facility] visit. Pt to call [facility] for follow up appt. Available as needed. Tash LazcanoRN,ext [phone].      Care Management Interventions  PCP Verified by CM: No  Palliative Care Criteria Met (RRAT>21 & CHF Dx)?: No  Mode of Transport at Discharge: Self  Transition of Care Consult (CM Consult): Home Health([facility])  [facility] VILLALOB[facility]: Yes  MyChart Signup: No  Discharge Durable Medical Equipment: No  Physical Therapy Consult: Yes  Occupational Therapy Consult: Yes  Speech Therapy Consult: Yes  The Plan for Transition of Care is Related to the Following Treatment Goals : [facility] visit  The Patient and/or Patient Representative was Provided with a Choice of Provider and Agrees with the Discharge Plan?: Yes  Name of the Patient Representative Who was Provided with a Choice of Provider and Agrees with the Discharge Plan: Regi Mix  [place] of Choice List was Provided with Basic Dialogue that Supports the Patient's Individualized Plan of Care/Goals, Treatment Preferences and Shares the Quality Data Associated with the Providers?: Yes  Discharge Location  Discharge Placement: Home with home health([facility] for [facility])

## 2020-06-07 NOTE — DIABETES MGMT
Glycemic Control: Contacted by Sivan VERAS for home insulin dose for uninsured pt. Recommend 18 units of 70/30 insulin BID based on 24 hour requirements and A1c=11.3%. I will contact pt after dc to follow up.    Yas GLEZ

## 2020-06-07 NOTE — DISCHARGE INSTRUCTIONS
Taking Aspirin and Other Antiplatelets Safely: Care Instructions  Your Care Instructions     Aspirin and other antiplatelet medicines help prevent blood clots from forming. They can help some people lower their risk of a heart attack or stroke. But these medicines can also make you more likely to bleed. That's why it's important to talk to your doctor before you start taking aspirin every day. It's not right for everyone. And if you and your doctor decide these medicines are right for you, learn how to take them safely. If you take aspirin, be sure you know how to take it. Your doctor can tell you what dose to take and how often to take it. One low-dose aspirin is 81 milligrams (mg). But the dose for daily aspirin can range from 81 mg to 325 mg. If you take another antiplatelet, take it as prescribed. Follow-up care is a key part of your treatment and safety. Be sure to make and go to all appointments, and call your doctor if you are having problems. It's also a good idea to know your test results and keep a list of the medicines you take. How can you care for yourself at home? · Before you start to take daily aspirin or some other antiplatelet, tell your doctor all the medicines, vitamins, herbal products, and supplements you take. · Tell your doctors, dentist, and pharmacist that you take an antiplatelet. · Take your medicine as your doctor directs. Make sure that you understand exactly what your doctor wants you to do. If another doctor says to stop taking the medicine for any reason, talk to the doctor who prescribed it before you stop. · Take your medicine at the same time every day. · Do not chew or crush the coated or time-release forms of your medicine. · If you miss a dose, don't take an extra dose to make up for it. · Ask your doctor whether you can drink alcohol. And ask how much you can drink.  When you take an antiplatelet, drinking too much raises your risk for liver damage and stomach bleeding. · If you are pregnant, are breastfeeding, or plan to become pregnant, talk to your doctor about what medicines are safe. · Talk with your doctor before you take a pain medicine. Many pain medicines have aspirin. Too much aspirin can be harmful. · Wear medical alert jewelry. This lets others know that you take an antiplatelet. You can buy it at most drugstores. · Try to avoid injuries that might make you bleed. For example, be careful when you exercise and when you play sports. Make your home safe to reduce your risk of falling. When should you call for help? Call 911 anytime you think you may need emergency care. For example, call if:  · You have a sudden, severe headache that is different from past headaches. Call your doctor now or seek immediate medical care if:  · You have any abnormal bleeding, such as:  ? A nosebleed that you can't easily stop. ? Bloody or black stools, or rectal bleeding. ? Bloody or pink urine. · You feel dizzy or lightheaded or feel like you may faint. Watch closely for changes in your health, and be sure to contact your doctor if you have any problems. Where can you learn more? Go to http://kiley-agueda.info/  Enter N271 in the search box to learn more about \"Taking Aspirin and Other Antiplatelets Safely: Care Instructions. \"  Current as of: December 16, 2019               Content Version: 12.5  © 2509-3209 Healthwise, Incorporated. Care instructions adapted under license by [employer] (which disclaims liability or warranty for this information). If you have questions about a medical condition or this instruction, always ask your healthcare professional. [place] [address] any warranty or liability for your use of this information. Patient Education        Diabetes Blood Sugar Emergencies: Your Action Plan  How can you prevent a blood sugar emergency?   An important part of living with diabetes is keeping your blood sugar in your target range. You'll need to know what to do if it's too high or too low. Managing your blood sugar levels helps you avoid emergencies. This care sheet will teach you about the signs of high and low blood sugar. It will help you make an action plan with your doctor for when these signs occur. Low blood sugar is more likely to happen if you take certain medicines for diabetes. It can also happen if you skip a meal, drink alcohol, or exercise more than usual.  You may get high blood sugar if you eat differently than you normally do. One example is eating more carbohydrate than usual. Having a cold, the flu, or other sudden illness can also cause high blood sugar levels. Levels can also rise if you miss a dose of medicine. Any change in how you take your medicine may affect your blood sugar level. So it's important to work with your doctor before you make any changes. Check your blood sugar  Work with your doctor to fill in the blank spaces below that apply to you. Track your levels, know your target range, and write down ways you can get your blood sugar back in your target range. A log book can help you track your levels. Take the book to all of your medical appointments. · Check your blood sugar _____ times a day, at these times:________________________________________________. (For example: Before meals, at bedtime, before exercise, during exercise, other.)  · Your blood sugar target range before a meal is ___________________. Your blood sugar target range after a meal is _______________________. · Do this--___________________________________________________--to get your blood sugar back within your safe range if your blood sugar results are _________________________________________. (For example: Less than 70 or above 250 mg/dL.)  Call your doctor when your blood sugar results are ___________________________________.   (For example: Less than 70 or above 250 mg/dL.)  What are the symptoms of low and high blood sugar? Common symptoms of low blood sugar are sweating and feeling shaky, weak, hungry, or confused. Symptoms can start quickly. Common symptoms of high blood sugar are feeling very thirsty or very hungry. You may also pass urine more often than usual. You may have blurry vision and may lose weight without trying. But some people may have high or low blood sugar without having any symptoms. That's a good reason to check your blood sugar on a regular schedule. What should you do if you have symptoms? Work with your doctor to fill in the blank spaces below that apply to you. Low blood sugar  If you have symptoms of low blood sugar, check your blood sugar. If it's below _____ ( for example, below 70), eat or drink a quick-sugar food that has about 15 grams of carbohydrate. Your goal is to get your level back to your safe range. Check your blood sugar again 15 minutes later. If it's still not in your target range, take another 15 grams of carbohydrate and check your blood sugar again in 15 minutes. Repeat this until you reach your target. Then go back to your regular testing schedule. Children usually need less than 15 grams of carbohydrate. Check with your doctor or diabetes educator for the amount that is right for your child. When you have low blood sugar, it's best to stop or reduce any physical activity until your blood sugar is back in your target range and is stable. If you must stay active, eat or drink 30 grams of carbohydrate. Then check your blood sugar again in 15 minutes. If it's not in your target range, take another 30 grams of carbohydrates. Check your blood sugar again in 15 minutes. Keep doing this until you reach your target. You can then go back to your regular testing schedule. If your symptoms or blood sugar levels are getting worse or have not improved after 15 minutes, seek medical care right away.    Here are some examples of quick-sugar foods with 15 grams of carbohydrate:  · 3 or 4 glucose tablets  · 1 tablespoon (3 teaspoons) table sugar  · ½ cup to ¾ cup (4 to 6 ounces) of fruit juice or regular (not diet) soda  · Hard candy (such as 6 Life Savers)  High blood sugar  If you have symptoms of high blood sugar, check your blood sugar. Your goal is to get your level back to your target range. If it's above ______ ( for example, above 250), follow these steps:  · If you missed a dose of your diabetes medicine, take it now. Take only the amount of medicine that you have been prescribed. Do not take more or less medicine. · Give yourself insulin if your doctor has prescribed it for high blood sugar. · Test for ketones, if the doctor told you to do so. If the results of the ketone test show a moderate-to-large amount of ketones, call the doctor for advice. · Wait 30 minutes after you take the extra insulin or the missed medicine. Check your blood sugar again. If your symptoms or blood sugar levels are getting worse or have not improved after taking these steps, seek medical care right away. Follow-up care is a key part of your treatment and safety. Be sure to make and go to all appointments, and call your doctor if you are having problems. It's also a good idea to know your test results and keep a list of the medicines you take. Where can you learn more? Go to http://kiley-agueda.info/  Enter O561 in the search box to learn more about \"Diabetes Blood Sugar Emergencies: Your Action Plan. \"  Current as of: December 20, 2019               Content Version: 12.5  © 5148-0844 Healthwise, Incorporated. Care instructions adapted under license by [employer] (which disclaims liability or warranty for this information). If you have questions about a medical condition or this instruction, always ask your healthcare professional. [address] any warranty or liability for your use of this information.          Patient Education Learning About Diabetes Food Guidelines  Your Care Instructions     Meal planning is important to manage diabetes. It helps keep your blood sugar at a target level (which you set with your doctor). You don't have to eat special foods. You can eat what your family eats, including sweets once in a while. But you do have to pay attention to how often you eat and how much you eat of certain foods. You may want to work with a dietitian or a certified diabetes educator (CDE) to help you plan meals and snacks. A dietitian or CDE can also help you lose weight if that is one of your goals. What should you know about eating carbs? Managing the amount of carbohydrate (carbs) you eat is an important part of healthy meals when you have diabetes. Carbohydrate is found in many foods. · Learn which foods have carbs. And learn the amounts of carbs in different foods. ? Bread, cereal, pasta, and rice have about 15 grams of carbs in a serving. A serving is 1 slice of bread (1 ounce), ½ cup of cooked cereal, or 1/3 cup of cooked pasta or rice. ? Fruits have 15 grams of carbs in a serving. A serving is 1 small fresh fruit, such as an apple or orange; ½ of a banana; ½ cup of cooked or canned fruit; ½ cup of fruit juice; 1 cup of melon or raspberries; or 2 tablespoons of dried fruit. ? Milk and no-sugar-added yogurt have 15 grams of carbs in a serving. A serving is 1 cup of milk or 2/3 cup of no-sugar-added yogurt. ? Starchy vegetables have 15 grams of carbs in a serving. A serving is ½ cup of mashed potatoes or sweet potato; 1 cup winter squash; ½ of a small baked potato; ½ cup of cooked beans; or ½ cup cooked corn or green peas. · Learn how much carbs to eat each day and at each meal. A dietitian or CDE can teach you how to keep track of the amount of carbs you eat. This is called carbohydrate counting. · If you are not sure how to count carbohydrate grams, use the Plate Method to plan meals.  It is a good, quick way to make sure that you have a balanced meal. It also helps you spread carbs throughout the day. ? Divide your plate by types of foods. Put non-starchy vegetables on half the plate, meat or other protein food on one-quarter of the plate, and a grain or starchy vegetable in the final quarter of the plate. To this you can add a small piece of fruit and 1 cup of milk or yogurt, depending on how many carbs you are supposed to eat at a meal.  · Try to eat about the same amount of carbs at each meal. Do not \"save up\" your daily allowance of carbs to eat at one meal.  · Proteins have very little or no carbs per serving. Examples of proteins are beef, chicken, turkey, fish, eggs, tofu, cheese, cottage cheese, and peanut butter. A serving size of meat is 3 ounces, which is about the size of a deck of cards. Examples of meat substitute serving sizes (equal to 1 ounce of meat) are 1/4 cup of cottage cheese, 1 egg, 1 tablespoon of peanut butter, and ½ cup of tofu. How can you eat out and still eat healthy? · Learn to estimate the serving sizes of foods that have carbohydrate. If you measure food at home, it will be easier to estimate the amount in a serving of restaurant food. · If the meal you order has too much carbohydrate (such as potatoes, corn, or baked beans), ask to have a low-carbohydrate food instead. Ask for a salad or green vegetables. · If you use insulin, check your blood sugar before and after eating out to help you plan how much to eat in the future. · If you eat more carbohydrate at a meal than you had planned, take a walk or do other exercise. This will help lower your blood sugar. What else should you know? · Limit saturated fat, such as the fat from meat and dairy products. This is a healthy choice because people who have diabetes are at higher risk of heart disease. So choose lean cuts of meat and nonfat or low-fat dairy products.  Use olive or canola oil instead of butter or shortening when cooking. · Don't skip meals. Your blood sugar may drop too low if you skip meals and take insulin or certain medicines for diabetes. · Check with your doctor before you drink alcohol. Alcohol can cause your blood sugar to drop too low. Alcohol can also cause a bad reaction if you take certain diabetes medicines. Follow-up care is a key part of your treatment and safety. Be sure to make and go to all appointments, and call your doctor if you are having problems. It's also a good idea to know your test results and keep a list of the medicines you take. Where can you learn more? Go to http://kiley-agueda.info/  Enter I147 in the search box to learn more about \"Learning About Diabetes Food Guidelines. \"  Current as of: December 20, 2019               Content Version: 12.5  © 0513-5315 Healthwise, Incorporated. Care instructions adapted under license by [employer] (which disclaims liability or warranty for this information). If you have questions about a medical condition or this instruction, always ask your healthcare professional. [place] [address] any warranty or liability for your use of this information. Patient Education        Learning About the Risk of Heart Attack and Stroke With Diabetes  How are diabetes, heart attack, and stroke connected? For some people, diabetes can cause problems that increase the risk of a heart attack or stroke. Many things can lead to a heart attack or stroke. These include high blood sugar, insulin resistance, high cholesterol, and high blood pressure. Lifestyle and genetics may also play a part. But here's the good news: The things you're doing to stay healthy with diabetes also help your heart and blood vessels. That means eating healthy foods, quitting smoking, and getting exercise. What increases your risk for heart attack and stroke?   When you have diabetes, your risk for heart attack and stroke is even higher if you have:  · High blood pressure. It pushes blood through the arteries with too much force. Over time, this damages the walls of the arteries. · High cholesterol. It causes the buildup of a kind of fat inside the blood vessel walls. This buildup can lower blood flow to the heart muscle and raise your risk for having a heart attack or stroke. · Kidney damage. It shares many of the risk factors for heart attack and stroke (such as high blood sugar, high blood pressure, and high cholesterol). How do you keep your heart healthy when you have diabetes? Managing your diabetes and keeping your heart and blood vessels healthy are both important. Here are some things you can do. · Test your blood sugar levels and get your diabetes tests on schedule. Try to keep your numbers within your target range. · Keep track of your blood pressure. Your doctor will give you a goal that's right for you. If your blood pressure is high, your treatment may also include medicine. Changes in your lifestyle, such as staying at a healthy weight, may also help you lower your blood pressure. · Eat heart-healthy foods. These include fruits, vegetables, whole grains, fish, and low-fat or nonfat dairy foods. Limit sodium, alcohol, and sweets. · If your doctor recommends it, get more exercise. Walking is a good choice. Bit by bit, increase the amount you walk every day. Try for at least 30 minutes on most days of the week. · Don't smoke. Smoking can make diabetes worse and increase your risk of heart attack or stroke. If you need help quitting, talk to your doctor about stop-smoking programs and medicines. These can increase your chances of quitting for good. · Think about taking medicines for your heart. For example, your doctor may suggest taking a statin or daily aspirin. Where can you learn more?   Go to http://kiley-agueda.info/  Enter N927 in the search box to learn more about \"Learning About the Risk of Heart Attack and Stroke With Diabetes. \"  Current as of: December 20, 2019               Content Version: 12.5  © 6350-7068 [employer]. Care instructions adapted under license by [employer] (which disclaims liability or warranty for this information). If you have questions about a medical condition or this instruction, always ask your healthcare professional. [address] any warranty or liability for your use of this information. Patient Education        Diabetic Nephropathy: Care Instructions  Your Care Instructions     Finding out that your kidneys have been damaged can be very distressing. It may have taken you by surprise, since damage to kidneys usually does not cause symptoms early on. It is normal to feel upset and afraid. Having diabetic nephropathy means that for some time you have had high blood sugar, which damages the kidneys. Healthy kidneys keep protein in your blood, where it belongs. Damaged kidneys do not work the way they should. Your kidneys are letting protein pass into your urine. Sometimes diabetic kidney disease can lead to kidney failure. Your doctor will tell you how you might be able to slow damage to your kidneys. In many cases, prompt and regular treatment can prevent kidney failure. You will need to take medicine and may need to make a number of changes in your normal routines. If you can keep your blood sugar and blood pressure under control and take certain medicines, you may reduce your chance of kidney failure. Follow-up care is a key part of your treatment and safety. Be sure to make and go to all appointments, and call your doctor if you are having problems. It's also a good idea to know your test results and keep a list of the medicines you take. How can you care for yourself at home? · Take your medicines exactly as prescribed.  It is very important that you take your insulin or other diabetes medicine as your doctor tells you. Call your doctor if you think you are having a problem with your medicine. · Try to keep blood sugar in your target range. ? Eat a variety of foods, with carbohydrate spread out in your meals. Your doctor may restrict your protein. A dietitian can help you plan meals. ? If your doctor recommends it, get more exercise. Walking is a good choice. Bit by bit, increase the amount you walk every day. Try for at least 30 minutes on most days of the week. ? Check your blood sugar as often as your doctor recommends. · Take and record your blood pressure at home if your doctor tells you to. Learn the importance of the two measures of blood pressure (such as 130 over 80, or 130/80). To take your blood pressure at home:  ? Ask your doctor to check your blood pressure monitor. He or she can make sure that it is accurate and that the cuff fits you. Also ask your doctor to watch you to make sure that you are using it right. ? Do not eat, use tobacco products, or use medicine known to raise blood pressure (such as some nasal decongestant sprays) before taking your blood pressure. ? Avoid taking your blood pressure if you have just exercised or are nervous or upset. Rest at least 15 minutes before taking a reading. · Eat a low-salt diet to help keep your blood pressure in your target range. · Do not smoke. Smoking raises your risk of many health problems, including diabetic nephropathy. If you need help quitting, talk to your doctor about stop-smoking programs and medicines. These can increase your chances of quitting for good. · Do not take ibuprofen, naproxen, or similar medicines, unless your doctor tells you to. These medicines may make kidney problems worse. When should you call for help? [id number] anytime you think you may need emergency care. For example, call if:  · You passed out (lost consciousness).   Call your doctor now or seek immediate medical care if:  · You have new or worse nausea and vomiting. · You have much less urine than normal, or you have no urine. · You are feeling confused or cannot think clearly. · You have new or more blood in your urine. · You have new swelling. · You are dizzy or lightheaded, or feel like you may faint. Watch closely for changes in your health, and be sure to contact your doctor if:  · You do not get better as expected. Where can you learn more? Go to http://kiley-agueda.info/  Enter P361 in the search box to learn more about \"Diabetic Nephropathy: Care Instructions. \"  Current as of: August 12, 2019               Content Version: 12.5  © 9366-8855 [employer]. Care instructions adapted under license by [employer] (which disclaims liability or warranty for this information). If you have questions about a medical condition or this instruction, always ask your healthcare professional. [place] [address] any warranty or liability for your use of this information. Patient Education        Learning About an Ischemic Stroke  What is an ischemic stroke? An ischemic (say \"rhn-HGR-[id number]\") stroke occurs when a blood clot blocks a blood vessel in the brain. This means that blood cannot flow to some part of the brain. Without blood and the oxygen it carries, this part of the brain starts to die. The part of the body controlled by the damaged area of the brain cannot work properly. This is different from a hemorrhagic (say \"xdl-nwh-VP-jick\") stroke, which happens when a blood vessel in the brain has burst open or has started to leak. The brain damage from a stroke starts within minutes. Quick treatment can help limit damage to the brain and make recovery more likely. People who have had a stroke may have a hard time talking, understanding things, and making decisions. They may have to relearn daily activities, such as how to eat, bathe, and dress.  How well someone recovers from a stroke depends on how quickly the person gets to the hospital, where in the brain the stroke happened, and how severe it was. Training and therapy also make a difference in how well people recover. What are the symptoms? If you have any of these symptoms, [id number] other emergency services right away:   · You have symptoms of a stroke. These may include:  ? Sudden numbness, tingling, weakness, or loss of movement in your face, arm, or leg, especially on only one side of your body. ? Sudden vision changes. ? Sudden trouble speaking. ? Sudden confusion or trouble understanding simple statements. ? Sudden problems with walking or balance. ? A sudden, severe headache that is different from past headaches. See your doctor if you have symptoms that seem like a stroke, even if they go away quickly. You may have had a transient ischemic attack (TIA), sometimes called a mini-stroke. A TIA is a warning that a stroke may happen soon. Getting early treatment for a TIA can help prevent a stroke. What causes an ischemic stroke? An ischemic stroke is caused by a blood clot that blocks blood flow in the brain. The most common causes of blood clots include:  · Hardening of the arteries (atherosclerosis). This is caused by high blood pressure, diabetes, high cholesterol, or smoking. · Atrial fibrillation. How is ischemic stroke treated? Emergency treatment may be done to restore blood flow to the brain using medicine or a procedure. You may take medicines to help prevent another stroke. Ask your doctor if a stroke rehab program is right for you. How can you prevent another stroke? · Work with your doctor to treat any health problems you have. High blood pressure, high cholesterol, atrial fibrillation, and diabetes all raise your chances of having a stroke. · Be safe with medicines. Take your medicine exactly as prescribed. Call your doctor if you think you are having a problem with your medicine. · Have a healthy lifestyle.   ? Do not smoke or allow others to smoke around you. If you need help quitting, talk to your doctor about stop-smoking programs and medicines. These can increase your chances of quitting for good. Smoking makes a stroke more likely. ? Limit alcohol to 2 drinks a day for men and 1 drink a day for women. ? Lose weight if you need to. A healthy weight will help you keep your heart and body healthy. ? Be active. Ask your doctor what type and level of activity is safe for you.  ? Eat heart-healthy foods, like fruits, vegetables, and high-fiber foods. Follow-up care is a key part of your treatment and safety. Be sure to make and go to all appointments, and call your doctor if you are having problems. It's also a good idea to know your test results and keep a list of the medicines you take. Where can you learn more? Go to http://kiley-agueda.info/  Enter D161 in the search box to learn more about \"Learning About an Ischemic Stroke. \"  Current as of: March 4, 2020               Content Version: 12.5  © 3331-5693 Healthwise, [employer]. Care instructions adapted under license by [employer] (which disclaims liability or warranty for this information). If you have questions about a medical condition or this instruction, always ask your healthcare professional. [address] any warranty or liability for your use of this information. Patient Education        Learning About How to Prevent Another Stroke  What can you do to prevent another stroke? After a stroke, people feel lots of different emotions. Some people are worried that they could have another stroke. Or they may feel overwhelmed by how much there is to learn and do. Some people feel sad or depressed. No matter what emotions you are feeling, you can give yourself some control and peace of mind by making a plan to lower your risk of having another stroke.   Take your medicines  You'll need to take medicines to help prevent another stroke. Be sure to take your medicines exactly as prescribed. And don't stop taking them unless your doctor tells you to. If you stop taking your medicines, you can increase your risk of having another stroke. Some of the medicines your doctor may prescribe include:  · Aspirin or some other blood thinner to prevent blood clots. · Statins and other medicines to lower cholesterol. · Blood pressure medicines to lower blood pressure. Manage other health problems  You can help lower your chance of having another stroke by managing certain other health problems. Problems that increase your risk of having another stroke include:  · High blood pressure. · High cholesterol. · Atrial fibrillation. · Diabetes. If you have any of these health problems, you can manage them with healthy lifestyle changes along with medicine. Adopt a healthy lifestyle  · Do not smoke or allow others to smoke around you. If you need help quitting, talk to your doctor about stop-smoking programs and medicines. These can increase your chances of quitting for good. Smoking makes a stroke more likely. · Limit alcohol to 2 drinks a day for men and 1 drink a day for women. · Lose weight if you need to. Controlling your weight will help you keep your heart and body healthy. · Be active. Ask your doctor what type and level of activity is safe for you. · Eat heart-healthy foods, like fruits, vegetables, and high-fiber foods. It's also important to:  · Get a flu shot every year. · Ask for help if you think you are depressed. Do stroke rehab  Taking part in a stroke rehabilitation (rehab) program will help you to regain skills you lost or make the most of your abilities after a stroke. It also helps you take steps to prevent another stroke. Your rehab team will give you education and support to help you build new, healthy habits. You'll learn how to manage any other health problems that you might have.  Mickey Garcia also learn how to exercise safely, eat a healthy diet, and quit smoking if you smoke. Omer Forman work with your team to decide what lifestyle choices are best for you. If your doctor hasn't already suggested it, ask him or her if stroke rehab is right for you. Know stroke symptoms  Make sure you know the symptoms of stroke. FAST is a simple way to remember. Recognizing these symptoms helps you know when to call for medical help. FAST stands for:  · F ace drooping. · A rm weakness. · S peech difficulty. · T ford to call 911. Follow-up care is a key part of your treatment and safety. Be sure to make and go to all appointments, and call your doctor if you are having problems. It's also a good idea to know your test results and keep a list of the medicines you take. Where can you learn more? Go to http://kiley-agueda.info/  Enter O382 in the search box to learn more about \"Learning About How to Prevent Another Stroke. \"  Current as of: March 4, 2020               Content Version: 12.5  © 3723-4671 Healthwise, [employer]. Care instructions adapted under license by [employer] (which disclaims liability or warranty for this information). If you have questions about a medical condition or this instruction, always ask your healthcare professional. [address] any warranty or liability for your use of this information.

## 2020-06-07 NOTE — CONSULTS
Teleneurology Consult    Patient ID  Name:  Shonda Phillips  :  1960  MRN:  [id number]  Age:  60 y.o. PCP:  None    Subjective:     Encounter Date:  2020    Referring Physician: Dr. Susi Stewart    Chief Complaint   Patient presents with    Numbness       History of Present Illness:   Shonda Phillips is a 60 y.o. RH , and MMA athlete, no pre-existing vascular risk factors except for one year of excessive soda intake, who developed abrupt right sided numbness in arm, leg and face with mild ataxia on evening of  and presented to ED. Patient received TPA with notable improvement of symptoms over the following 2 hours but with residuals. Of note he reports one month of distal paresthesias in his feet which he had attributed to plantar fasciitis. MRI of brain notable for acute left thalamic infarct without large vessel occlusion on MRA of head/neck.      Current Facility-Administered Medications   Medication Dose Route Frequency Provider Last Rate Last Dose    sodium chloride (NS) flush 5-40 mL  5-40 mL IntraVENous Q8H Elba Henley MD   10 mL at 20 0520    sodium chloride (NS) flush 5-40 mL  5-40 mL IntraVENous PRN Elba Henley MD        ondansetron [facility]) injection 4 mg  4 mg IntraVENous Q6H PRN Elba Henley MD        atorvastatin (LIPITOR) tablet 80 mg  80 mg Oral QHS Elba Henley MD   80 mg at 20 2116    enoxaparin (LOVENOX) injection 40 mg  40 mg SubCUTAneous Q24H Elba Henley MD   40 mg at 20 0840    niCARdipine in Saline (CARDENE) 0.1mg/mL 200 ml premix infusion  5 mg/hr IntraVENous CONTINUOUS Elba Henley MD   Stopped at 20 0730    insulin lispro (HUMALOG) injection   SubCUTAneous AC&HS Elba Henley MD   3 Units at 20 0703    glucose chewable tablet 16 g  4 Tab Oral PRN Elba Henley MD        glucagon [facility]) injection 1 mg  1 mg IntraMUSCular PRN Elba Henley MD        aspirin tablet 325 mg  325 mg Oral DAILY Stacia Alfaro MD K[facility]LESH   325 mg at 06/07/20 0840    acetaminophen (TYLENOL) tablet 650 mg  650 mg Oral Q6H PRN Rhetta , DO   650 mg at 06/06/20 2241    dextrose 10% infusion 125-250 mL  125-250 mL IntraVENous PRN Rheanibala , DO        influenza vaccine 2019-20 (6 mos+)(PF) (FLUARIX/FLULAVAL/FLUZONE QUAD) injection 0.5 mL  0.5 mL IntraMUSCular PRIOR TO DISCHARGE Jason Guillen DO        insulin glargine (LANTUS) injection 24 Units  0.26 Units/kg SubCUTAneous DAILY Rheanibala , DO   24 Units at 06/07/20 0840     No Known Allergies  Patient Active Problem List   Diagnosis Code    CVA (cerebral vascular accident) ([facility] [address] I63.9    Hyperglycemia R73.9     No past medical history on file. No past surgical history on file. No family history on file. Social History     Socioeconomic History    Marital status: SINGLE     Spouse name: Not on file    Number of children: Not on file    Years of education: Not on file    Highest education level: Not on file       Review of Systems:  Pertinent items are noted in HPI. Objective:     Vitals:    06/07/20 0700 06/07/20 0800 06/07/20 0900 06/07/20 1000   BP: 114/76 128/75 99/66    Pulse: 66 67 76 85   Resp: 17 15 26 22   Temp:  97.6 °F (36.4 °C)     SpO2: 98% 98% 99% 100%   Weight:       Height:           Physical Exam:    General:  Patient seen via telemedicine video encounter utilizing a tele-presenter. No acute distress. NEUROLOGICAL EXAMINATION:     Mental Status:   Alert and oriented to person, place, and time with recent and remote memory intact. Attention span and concentration are normal.   Speech is fluent with a full fund of knowledge. Cranial Nerves:    II, III, IV, VI:  Visual fields are normal to confrontation. Pupils are equal, round, and reactive to light and accommodation. Extra-ocular movements are full and fluid. No ptosis or nystagmus. V-XII:   Face is symmetric  Normal sensation.     The palate rises symmetrically and the tongue protrudes midline. Sternocleidomastoids 5/5.       Motor Examination: Strength 5/5   No involuntary movements, Normal tone    Sensory exam:  Normal throughout to light touch per telepresenter but patient reports subjective numbness in RUE and RLE  Coordination:  No dysmetria, No resting or intention tremor    Gait and Station:  Not tested  Reflexes:  Not tested by telepresenter  Labs  Recent Results (from the past 24 hour(s))   GLUCOSE, POC    Collection Time: 06/06/20 11:14 AM   Result Value Ref Range    Glucose (POC) 264 (H) 70 - 110 mg/dL   ECHO ADULT COMPLETE    Collection Time: 06/06/20 11:21 AM   Result Value Ref Range    AO ASC D 3.17 cm    LVIDd 4.62 4.2 - 5.9 cm    LVPWd 1.33 (A) 0.6 - 1.0 cm    LVIDs 4.09 cm    IVSd 1.37 (A) 0.6 - 1.0 cm    LV ED Vol A2C 104.4 mL    LV ES Vol A4C 41.0 mL    LV ES Vol BP 46.0 22 - 58 mL    LVOT d 2.53 cm    MV A Cody 55.49 cm/s    MV E Cody 48.21 cm/s    MV E/A 0.87     Left Atrium to Aortic Root Ratio 1.28     BP EF 57.7 55 - 100 %    LV Ejection Fraction MOD 4C 63 %    LV Ejection Fraction MOD 2C 52 %    Inferior Vena Cava Diameter Sniffing 1.57 cm    LV Mass .5 (A) 88 - 224 g    LV Mass AL Index 137.7 49 - 115 g/m2    LV ES Vol A2C 50.2 mL    LVES Vol Index BP 21.8 mL/m2    LV ED Vol A4C 111.5 mL    LVED Vol Index BP 51.5 mL/m2    Mitral Valve E Wave Deceleration Time 186.4 ms    Right Atrium Minor Axis 4.02 cm    Left Atrium Major Axis 3.75 cm    LV ED Vol .7 67 - 155 ml    LVED Vol Index A4C 52.9 mL/m2    LVED Vol Index A2C 49.5 mL/m2    LVES Vol Index A4C 19.4 mL/m2    LVES Vol Index A2C 23.8 mL/m2    Left Ventricular Fractional Shortening by 2D 27.64537244 %    Mitral Valve Deceleration Baker 2.8238016106     Left Ventricular End Diastolic Volume by Teichholz Method 8.73144203129 mL    Left Ventricular End Systolic Volume by Teichholz Method 7.21152860557 mL    Left Ventricular Stroke Volume by 2-D Biplane-MOD 36.349540494 mL Left Ventricular Stroke Volume by 2-D Single Plane- MOD 96.704963837 mL    Left Ventricular Stroke Volume by Teichholz Method 66.598234014 mL    Left Ventricular Stroke Volume by 2-D Single Plane- MOD 17.383517311 mL    Ao Root D 2.92 cm   GLUCOSE, POC    Collection Time: 06/06/20  4:36 PM   Result Value Ref Range    Glucose (POC) 220 (H) 70 - 110 mg/dL   GLUCOSE, POC    Collection Time: 06/06/20  9:19 PM   Result Value Ref Range    Glucose (POC) 219 (H) 70 - 110 mg/dL   CBC W/O DIFF    Collection Time: 06/07/20  3:35 AM   Result Value Ref Range    WBC 6.0 4.6 - 13.2 K/uL    RBC 5.18 4.70 - 5.50 M/uL    HGB 15.7 13.0 - 16.0 g/dL    HCT 45.8 36.0 - 48.0 %    MCV 88.4 74.0 - 97.0 FL    MCH 30.3 24.0 - 34.0 PG    MCHC 34.3 31.0 - 37.0 g/dL    RDW 12.1 11.6 - 14.5 %    PLATELET 226 239 - 214 K/uL    MPV 9.4 9.2 - 64.8 FL   METABOLIC PANEL, BASIC    Collection Time: 06/07/20  3:35 AM   Result Value Ref Range    Sodium 135 (L) 136 - 145 mmol/L    Potassium 3.6 3.5 - 5.5 mmol/L    Chloride 102 100 - 111 mmol/L    CO2 27 21 - 32 mmol/L    Anion gap 6 3.0 - 18 mmol/L    Glucose 171 (H) 74 - 99 mg/dL    BUN 9 7.0 - 18 MG/DL    Creatinine 0.83 0.6 - 1.3 MG/DL    BUN/Creatinine ratio 11 (L) 12 - 20      GFR est AA >60 >60 ml/min/1.73m2    GFR est non-AA >60 >60 ml/min/1.73m2    Calcium 9.1 8.5 - 10.1 MG/DL   GLUCOSE, POC    Collection Time: 06/07/20  7:00 AM   Result Value Ref Range    Glucose (POC) 187 (H) 70 - 110 mg/dL   GLUCOSE, POC    Collection Time: 06/07/20  8:13 AM   Result Value Ref Range    Glucose (POC) 170 (H) 70 - 110 mg/dL        Relevant Radiology:   Mri Brain Wo Cont    Result Date: 6/6/2020  EXAM: MRI of the brain without intravenous contrast. INDICATION:  Strokelike symptoms. Right-sided numbness. Right leg weakness. Ataxia. COMPARISON:  No prior MRI is available for direct comparison. CORRELATION (related prior exam):  Recent CT.  PROTOCOL:  Routine brain. _______________ FINDINGS:       IMAGE QUALITY: Diagnostic. BRAIN AND EXTRA-AXIAL SPACE:           ACUTE/SUBACUTE INFARCT:  15 x 8 mm focus in the left thalamus. MASS:  None. HEMORRHAGE:  None. SUBDURAL FLUID COLLECTION:  None. HYDROCEPHALUS:  None. ICA AND DOMINANT VA T2 FLOW VOIDS:  Unremarkable. REMOTE CEREBRAL NON-LACUNAR INFARCT:  None definite. REMOTE CEREBELLAR INFARCT:  None definite. 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 The cisterna magna is developmentally enlarged, which is a nonpathologic anatomic variant. STRIVE (STandards for Reporting Vascular changes on nEuroimaging):                            --[place] of white matter hyperintensity (\"leukoaraiosis\") of presumed vascular origin:  None significant. --[place] of chronic lacunes of presumed vascular origin:  None by 3 mm STRIVE criteria. --[place] of perivascular spaces:  None significant. --[place] of \"microbleeds\":   None definite. --Degree of brain atrophy:  None significant. SELLA/PITUITARY:  Unremarkable. HEENT:            ORBITS:  Unremarkable. PARANASAL SINUSES:  Predominantly clear. MASTOID AIR CELLS:  Predominantly clear. INCLUDED UPPER CERVICAL LYMPH NODES:  Unremarkable. INCLUDED UPPER PAROTIDS:  Unremarkable. NASOPHARYNX:  Unremarkable. BACKGROUND BONE MARROW SIGNAL:  Unremarkable. SCALP:  Unremarkable. _______________     IMPRESSION: Acute/subacute left thalamic lacunar infarct. _______________         Impression:   1) S/p Left Thalamic Lacunar Infarct with residual right sided numbness (post-tpa)  2) New onset Diabetes (HgB A1C of 11/ 8 sodas/day x 1 year) with preceding distal feet numbness x 1 month  Plan:     Repeat Head CT reviewed and no evidence of bleeding post-tpa.  Agree with  mg as well as lipitor 80 mg daily  Agree with diabetic education and aggressive management  Would also suggest covid19 screening test in event of contribution to pro-thrombotic state  If negative ok for discharge from neuro standpoint with outpatient neurology follow-up.        Signed By:  Kimberlyn Walker DO     6/7/2020    [facility]

## 2020-06-08 ENCOUNTER — HOSPITAL ENCOUNTER (EMERGENCY)
Age: 60
Discharge: HOME | End: 2020-06-08
Attending: EMERGENCY MEDICINE

## 2020-06-08 ENCOUNTER — APPOINTMENT (OUTPATIENT)
Dept: CT IMAGING | Age: 60
End: 2020-06-08
Attending: EMERGENCY MEDICINE

## 2020-06-08 VITALS
DIASTOLIC BLOOD PRESSURE: 75 MMHG | TEMPERATURE: 98 F | HEART RATE: 85 BPM | SYSTOLIC BLOOD PRESSURE: 138 MMHG | RESPIRATION RATE: 18 BRPM | OXYGEN SATURATION: 98 %

## 2020-06-08 DIAGNOSIS — I63.9 CEREBROVASCULAR ACCIDENT (CVA), UNSPECIFIED MECHANISM (HCC): Primary | ICD-10-CM

## 2020-06-08 DIAGNOSIS — Z00.00 WELL ADULT EXAM: ICD-10-CM

## 2020-06-08 LAB
ALBUMIN SERPL-MCNC: 3.9 G/DL (ref 3.4–5)
ALBUMIN/GLOB SERPL: 1 {RATIO} (ref 0.8–1.7)
ALP SERPL-CCNC: 63 U/L (ref 45–117)
ALT SERPL-CCNC: 42 U/L (ref 16–61)
ANION GAP SERPL CALC-SCNC: 9 MMOL/L (ref 3–18)
APPEARANCE UR: CLEAR
AST SERPL-CCNC: 30 U/L (ref 10–38)
BASOPHILS # BLD: 0 K/UL (ref 0–0.1)
BASOPHILS NFR BLD: 1 % (ref 0–2)
BILIRUB SERPL-MCNC: 0.5 MG/DL (ref 0.2–1)
BILIRUB UR QL: NEGATIVE
BUN SERPL-MCNC: 14 MG/DL (ref 7–18)
BUN/CREAT SERPL: 15 (ref 12–20)
CALCIUM SERPL-MCNC: 9.4 MG/DL (ref 8.5–10.1)
CHLORIDE SERPL-SCNC: 101 MMOL/L (ref 100–111)
CK MB CFR SERPL CALC: 0.7 % (ref 0–4)
CK MB SERPL-MCNC: 1.8 NG/ML (ref 5–25)
CK SERPL-CCNC: 249 U/L (ref 39–308)
CO2 SERPL-SCNC: 27 MMOL/L (ref 21–32)
COLOR UR: YELLOW
CREAT SERPL-MCNC: 0.91 MG/DL (ref 0.6–1.3)
DIFFERENTIAL METHOD BLD: ABNORMAL
EOSINOPHIL # BLD: 0.2 K/UL (ref 0–0.4)
EOSINOPHIL NFR BLD: 3 % (ref 0–5)
ERYTHROCYTE [DISTWIDTH] IN BLOOD BY AUTOMATED COUNT: 12.2 % (ref 11.6–14.5)
GLOBULIN SER CALC-MCNC: 4 G/DL (ref 2–4)
GLUCOSE SERPL-MCNC: 208 MG/DL (ref 74–99)
GLUCOSE UR STRIP.AUTO-MCNC: 250 MG/DL
HCT VFR BLD AUTO: 47.5 % (ref 36–48)
HGB BLD-MCNC: 16.6 G/DL (ref 13–16)
HGB UR QL STRIP: NEGATIVE
KETONES UR QL STRIP.AUTO: NEGATIVE MG/DL
LEUKOCYTE ESTERASE UR QL STRIP.AUTO: NEGATIVE
LYMPHOCYTES # BLD: 1.7 K/UL (ref 0.9–3.6)
LYMPHOCYTES NFR BLD: 27 % (ref 21–52)
MAGNESIUM SERPL-MCNC: 1.7 MG/DL (ref 1.6–2.6)
MCH RBC QN AUTO: 30.9 PG (ref 24–34)
MCHC RBC AUTO-ENTMCNC: 34.9 G/DL (ref 31–37)
MCV RBC AUTO: 88.5 FL (ref 74–97)
MONOCYTES # BLD: 0.6 K/UL (ref 0.05–1.2)
MONOCYTES NFR BLD: 10 % (ref 3–10)
NEUTS SEG # BLD: 3.8 K/UL (ref 1.8–8)
NEUTS SEG NFR BLD: 59 % (ref 40–73)
NITRITE UR QL STRIP.AUTO: NEGATIVE
PH UR STRIP: 5.5 [PH] (ref 5–8)
PLATELET # BLD AUTO: 240 K/UL (ref 135–420)
PMV BLD AUTO: 8.9 FL (ref 9.2–11.8)
POTASSIUM SERPL-SCNC: 4 MMOL/L (ref 3.5–5.5)
PROT SERPL-MCNC: 7.9 G/DL (ref 6.4–8.2)
PROT UR STRIP-MCNC: NEGATIVE MG/DL
RBC # BLD AUTO: 5.37 M/UL (ref 4.7–5.5)
SODIUM SERPL-SCNC: 137 MMOL/L (ref 136–145)
SP GR UR REFRACTOMETRY: 1.01 (ref 1–1.03)
TROPONIN I SERPL-MCNC: <0.02 NG/ML (ref 0–0.04)
TSH SERPL DL<=0.05 MIU/L-ACNC: 1.11 UIU/ML (ref 0.36–3.74)
UROBILINOGEN UR QL STRIP.AUTO: 0.2 EU/DL (ref 0.2–1)
WBC # BLD AUTO: 6.3 K/UL (ref 4.6–13.2)

## 2020-06-08 PROCEDURE — 99285 EMERGENCY DEPT VISIT HI MDM: CPT

## 2020-06-08 PROCEDURE — 70450 CT HEAD/BRAIN W/O DYE: CPT

## 2020-06-08 PROCEDURE — 80053 COMPREHEN METABOLIC PANEL: CPT

## 2020-06-08 PROCEDURE — 81003 URINALYSIS AUTO W/O SCOPE: CPT

## 2020-06-08 PROCEDURE — 84443 ASSAY THYROID STIM HORMONE: CPT

## 2020-06-08 PROCEDURE — 85025 COMPLETE CBC W/AUTO DIFF WBC: CPT

## 2020-06-08 PROCEDURE — 93005 ELECTROCARDIOGRAM TRACING: CPT

## 2020-06-08 PROCEDURE — 83735 ASSAY OF MAGNESIUM: CPT

## 2020-06-08 PROCEDURE — 82550 ASSAY OF CK (CPK): CPT

## 2020-06-08 NOTE — ED TRIAGE NOTES
Patient here for muscle tightness on ride side of body this am. Not having any symptoms at this time. Negative on stroke scale. Patient woke up at [facility] 97 11pm yesterday. Patient was seen here on Friday for stroke. Treated with TPA. Deficits to right side. Left ICU yesterday. While he was in ICU his right side would tighten up and then would go away.

## 2020-06-08 NOTE — ED PROVIDER NOTES
Date: 6/8/2020  Patient Name: Kristofer Colbert    History of Presenting Illness     Chief Complaint   Patient presents with    Muscle Problem       History Provided By: Patient    HPI/Chief Complaint: (Context):who presents with right arm and leg spasm after walking  No weakness or numbness that is new although patient had CVA 3 days ago and patient states she did get TPA after 2 CTs were done and symptoms are better but not completely resolved  He has no chest pain shortness of breath no vomiting no diarrhea no palpitations no cough congestion no respiratory symptoms  No trauma  Patient is a even try to do some squats to make sure he can get the symptoms resolved but because of spasm he did not want to take any chance and decided to come in  There is no radiation of symptoms  Symptoms are dramatically improved with spasm but chronic with slight weakness and numbness still remains from the Fridays event. Patient does have family history of CVA and due to that he was high risk otherwise he himself has not seen a physician in over 20 years.  ===      Patient's triage note is reviewed  ASA level 2  Patient with muscle problem  Vitals are stable in the emergency department  Patient triage note is reviewed  Allergies are none  Med history includes CVA and diabetes that was recently diagnosed  Medications aspirin Lipitor insulin metformin  Patient social history is no smoking no alcohol no drugs per patient next patient's prior visit note is reviewed CVA was a diagnosis for the patient  Right-sided symptoms were appreciated  The MRA was negative there was a left thalamic stroke that was appreciated on MRI  Patient did have improvement with symptoms with TPA use        PCP: None    Current Outpatient Medications   Medication Sig Dispense Refill    aspirin (ASPIRIN) 325 mg tablet Take 1 Tab by mouth daily. 30 Tab 0    atorvastatin (LIPITOR) 80 mg tablet Take 1 Tab by mouth nightly.  30 Tab 0    metFORMIN (GLUCOPHAGE) 500 mg tablet Take 1 Tab by mouth two (2) times daily (with meals). 60 Tab 0    lancets-blood glucose strips 30 gauge cmpk Use with blood glucose monitor 1 Dose Pack 0    Insulin Syringe-Needle U-100 (Insulin Syringe) 0.5 mL 29 gauge x 1/2\" syrg 1 Syringe by Does Not Apply route ACB/HS. 100 Syringe 0    insulin NPH/insulin regular (NovoLIN 70/30 U-100 Insulin) 100 unit/mL (70-30) injection 18 Units by SubCUTAneous route Before breakfast and dinner. 10 mL 1       Past History     Past Medical History:  Past Medical History:   Diagnosis Date    CVA (cerebral vascular accident) ([facility] [address] 06/05/2020    Diabetes mellitus ([facility] [address] 06/07/2020       Past Surgical History:  History reviewed. No pertinent surgical history. Family History:  History reviewed. No pertinent family history. Social History:  Social History     Tobacco Use    Smoking status: Not on file   Substance Use Topics    Alcohol use: Not on file    Drug use: Not on file       Allergies:  No Known Allergies      Review of Systems   Review of Systems   Constitutional: Negative for activity change, fatigue and fever. HENT: Negative for congestion and rhinorrhea. Eyes: Negative for visual disturbance. Respiratory: Negative for shortness of breath. Cardiovascular: Negative for chest pain and palpitations. Gastrointestinal: Negative for abdominal pain, diarrhea, nausea and vomiting. Genitourinary: Negative for dysuria and hematuria. Musculoskeletal: Negative for back pain. Skin: Negative for rash. Neurological: Positive for weakness and numbness. Negative for dizziness and light-headedness. Chronic since prior stroke. Patient with acute tightness in arm and leg which is dramatically improved today. Psychiatric/Behavioral: Negative for agitation. All other systems reviewed and are negative. Physical Exam     Physical Exam  Constitutional:       Appearance: He is well-developed.    HENT:      Head: Normocephalic and atraumatic. Eyes:      Conjunctiva/sclera: Conjunctivae normal.      Pupils: Pupils are equal, round, and reactive to light. Neck:      Musculoskeletal: Normal range of motion and neck supple. Cardiovascular:      Rate and Rhythm: Normal rate and regular rhythm. Pulmonary:      Effort: Pulmonary effort is normal.      Breath sounds: Normal breath sounds. Abdominal:      General: Bowel sounds are normal.      Palpations: Abdomen is soft. Musculoskeletal: Normal range of motion. Lymphadenopathy:      Cervical: No cervical adenopathy. Skin:     General: Skin is warm. Capillary Refill: Capillary refill takes less than 2 seconds. Neurological:      Mental Status: He is alert and oriented to person, place, and time. Mental status is at baseline. Sensory: Sensory deficit present. Motor: Weakness present. Comments: Right arm sensorimotor deficits which are minimal and appreciable by me but patient states there is symptoms are there and improved since the TPA was given. These are not acute symptoms but from the presentation  Stroke  Patient is no significant spasm or tremors currently patient says the tightness that he came in for the right arm and leg have improved dramatically. Psychiatric:         Mood and Affect: Mood normal.         Medical Decision Making   I am the first provider for this patient. I reviewed the vital signs, available nursing notes, past medical history, past surgical history, family history and social history. Provider Notes (Medical Decision Making):  Patient with right arm tightness and leg tightness as well although these are not similar to the episode symptoms that he had with arm and leg weakness on the right side    I will get CT head for any acute pathology I will talk to tele-neurology further management as well I am checking patient's basic electrolytes as well. Vital Signs-Reviewed the patient's vital signs.     Pulse Oximetry Analysis -97%, room air, normal    Cardiac Monitor:  Rate/Rhythm:  79, sinus rhythm    EKG: Interpreted by the EP. Patient's EKG done today at 10:53 AM shows 79 heart rate leftward axes normal intervals otherwise no sign of acute ischemia or dysrhythmia on the EKG       Vitals:    06/08/20 1014 06/08/20 1021   BP: 118/80    Pulse: 85    Resp: 18    Temp:  98 °F (36.7 °C)   SpO2: 97%        Records Reviewed: Nursing Notes    ED Course:    11:51 AM  Patient is being seen by telemetry neurologist currently  Information has been discussed with her. CT head is pending  It is our consensus that there is nothing acute this is probably a post stroke symptom and patient is to be counseled and if any change or worsen return to the emergency department otherwise should be comfortable discharging the patient. ---    For Hospitalized Patients:          Diagnostic Study Results     Orders Placed This Encounter    CT HEAD WO CONT     Standing Status:   Standing     Number of Occurrences:   1     Order Specific Question:   Transport     Answer:   Jacquelin [5]     Order Specific Question:   Reason for Exam     Answer:   right sided tightness. Recent CVA and TPAConcern for acute decompensation and stroke.  CBC WITH AUTOMATED DIFF     Standing Status:   Standing     Number of Occurrences:   1    METABOLIC PANEL, COMPREHENSIVE     Standing Status:   Standing     Number of Occurrences:   1    MAGNESIUM     Standing Status:   Standing     Number of Occurrences:   1    TSH 3RD GENERATION     Standing Status:   Standing     Number of Occurrences:   1    URINALYSIS W/ RFLX MICROSCOPIC     Standing Status:   Standing     Number of Occurrences:   1    CARDIAC PANEL,(CK, CKMB & TROPONIN)     Standing Status:   Standing     Number of Occurrences:   1    CARDIAC MONITORING     Standing Status:   Standing     Number of Occurrences:   1     Order Specific Question:   Type:      Answer:   Bedside    EKG, 12 LEAD, INITIAL     Standing Status: Standing     Number of Occurrences:   1     Order Specific Question:   Reason for Exam:     Answer:   muscle spasm    IP CONSULT TO TELE-NEUROLOGY     Standing Status:   Standing     Number of Occurrences:   1     Order Specific Question:   Reason for Consult: Answer:   right sided tightness. Recent CVA and TPAConcern for acute decompensation and stroke. Labs -     Recent Results (from the past 12 hour(s))   CBC WITH AUTOMATED DIFF    Collection Time: 06/08/20 10:45 AM   Result Value Ref Range    WBC 6.3 4.6 - 13.2 K/uL    RBC 5.37 4.70 - 5.50 M/uL    HGB 16.6 (H) 13.0 - 16.0 g/dL    HCT 47.5 36.0 - 48.0 %    MCV 88.5 74.0 - 97.0 FL    MCH 30.9 24.0 - 34.0 PG    MCHC 34.9 31.0 - 37.0 g/dL    RDW 12.2 11.6 - 14.5 %    PLATELET 109 219 - 110 K/uL    MPV 8.9 (L) 9.2 - 11.8 FL    NEUTROPHILS 59 40 - 73 %    LYMPHOCYTES 27 21 - 52 %    MONOCYTES 10 3 - 10 %    EOSINOPHILS 3 0 - 5 %    BASOPHILS 1 0 - 2 %    ABS. NEUTROPHILS 3.8 1.8 - 8.0 K/UL    ABS. LYMPHOCYTES 1.7 0.9 - 3.6 K/UL    ABS. MONOCYTES 0.6 0.05 - 1.2 K/UL    ABS. EOSINOPHILS 0.2 0.0 - 0.4 K/UL    ABS. BASOPHILS 0.0 0.0 - 0.1 K/UL    DF AUTOMATED     METABOLIC PANEL, COMPREHENSIVE    Collection Time: 06/08/20 10:45 AM   Result Value Ref Range    Sodium 137 136 - 145 mmol/L    Potassium 4.0 3.5 - 5.5 mmol/L    Chloride 101 100 - 111 mmol/L    CO2 27 21 - 32 mmol/L    Anion gap 9 3.0 - 18 mmol/L    Glucose 208 (H) 74 - 99 mg/dL    BUN 14 7.0 - 18 MG/DL    Creatinine 0.91 0.6 - 1.3 MG/DL    BUN/Creatinine ratio 15 12 - 20      GFR est AA >60 >60 ml/min/1.73m2    GFR est non-AA >60 >60 ml/min/1.73m2    Calcium 9.4 8.5 - 10.1 MG/DL    Bilirubin, total 0.5 0.2 - 1.0 MG/DL    ALT (SGPT) 42 16 - 61 U/L    AST (SGOT) 30 10 - 38 U/L    Alk.  phosphatase 63 45 - 117 U/L    Protein, total 7.9 6.4 - 8.2 g/dL    Albumin 3.9 3.4 - 5.0 g/dL    Globulin 4.0 2.0 - 4.0 g/dL    A-G Ratio 1.0 0.8 - 1.7     MAGNESIUM    Collection Time: 06/08/20 10:45 AM   Result Value Ref Range    Magnesium 1.7 1.6 - 2.6 mg/dL   TSH 3RD GENERATION    Collection Time: 06/08/20 10:45 AM   Result Value Ref Range    TSH 1.11 0.36 - 3.74 uIU/mL   CARDIAC PANEL,(CK, CKMB & TROPONIN)    Collection Time: 06/08/20 10:45 AM   Result Value Ref Range    CK - MB 1.8 <3.6 ng/ml    CK-MB Index 0.7 0.0 - 4.0 %     39 - 308 U/L    Troponin-I, QT <0.02 0.0 - 0.045 NG/ML   EKG, 12 LEAD, INITIAL    Collection Time: 06/08/20 10:51 AM   Result Value Ref Range    Ventricular Rate 79 BPM    Atrial Rate 79 BPM    P-R Interval 156 ms    QRS Duration 94 ms    Q-T Interval 376 ms    QTC Calculation (Bezet) 431 ms    Calculated P Axis 43 degrees    Calculated R Axis -27 degrees    Calculated T Axis 4 degrees    Diagnosis       Normal sinus rhythm  Normal ECG  When compared with ECG of 06-JUN-2020 00:12,  No significant change was found     URINALYSIS W/ RFLX MICROSCOPIC    Collection Time: 06/08/20 11:45 AM   Result Value Ref Range    Color YELLOW      Appearance CLEAR      Specific gravity 1.012 1.005 - 1.030      pH (UA) 5.5 5.0 - 8.0      Protein Negative NEG mg/dL    Glucose 250 (A) NEG mg/dL    Ketone Negative NEG mg/dL    Bilirubin Negative NEG      Blood Negative NEG      Urobilinogen 0.2 0.2 - 1.0 EU/dL    Nitrites Negative NEG      Leukocyte Esterase Negative NEG         Radiologic Studies -   CT HEAD WO CONT   Final Result   IMPRESSION:      1. Persistent findings compatible with subacute left thalamic ischemic infarct,   with slight increased area of edema, without significant surrounding mass   effect. 2. No evidence of intracranial hemorrhage or other new acute intracranial   finding. CT Results  (Last 48 hours)               06/08/20 1238  CT HEAD WO CONT Final result    Impression:  IMPRESSION:       1. Persistent findings compatible with subacute left thalamic ischemic infarct,   with slight increased area of edema, without significant surrounding mass   effect.        2. No evidence of intracranial hemorrhage or other new acute intracranial   finding. Narrative:  EXAM: CT head       CLINICAL INDICATION/HISTORY: Recent CVA and TPA administration, right-sided   weakness, diabetes. COMPARISON: 6/7/2020. TECHNIQUE: Axial CT imaging of the head  was obtained from skull base to vertex   without intravenous contrast. Coronal and sagittal reconstructions were   obtained. One or more dose reduction techniques were used on this CT: automated   exposure control, adjustment of the mAs and/or kVp according to patient's size,   and iterative reconstruction techniques. The specific techniques utilized on   this CT exam have been documented in the patient's electronic medical record. Digital imaging and communications and medicine (DICOM) format image data are   available to nonaffiliated external healthcare facilities or entities on a   secure, media free, reciprocally searchable basis with patient authorization for   at least a 12 month period after this study. _______________       FINDINGS:       BRAIN AND POSTERIOR FOSSA: Again noted is area of hypodensity involving the   dorsal lateral left thalamus which appears slightly more pronounced than prior   study but with no ventricular compression or midline shift and no interval   development of hemorrhage. No other new cortical or white matter hypodensity or   evidence of mass. EXTRA-AXIAL SPACES AND MENINGES: Stable prominent extra-axial CSF density dorsal   midline posterior fossa, developmental, no new abnormal extra-axial fluid   collection or mass. CALVARIUM: No acute osseous abnormality. SINUSES: The visualized portions of the paranasal sinuses and mastoid air cells   are well aerated. OTHER: None.       _______________           06/07/20 0137  CT HEAD WO CONT Final result    Impression:  IMPRESSION:       Negative for hemorrhage post TPA.        Redemonstrated acute/subacute left thalamic lacunar infarct.        _______________                               Narrative:  EXAM: CT of the brain without intravenous contrast.       INDICATION:  \"24 hour TPA follow up. \"       COMPARISON:  CT yesterday. DOSE REDUCTION AND DICOM INFORMATION: One or more dose reduction techniques were   used on this CT: automated exposure control, adjustment of the mAs and/or kVp   according to patient size, and iterative reconstruction techniques. The   specific techniques used on this CT exam have been documented in the patient's   electronic medical record. Digital Imaging and Communications in Medicine   (DICOM) format image data are available to nonaffiliated external healthcare   facilities or entities on a secure, media free, reciprocally searchable basis   with patient authorization for at least a 12-month period after this study. _______________       FINDINGS:            IMAGE QUALITY:  Diagnostic. BRAIN AND EXTRA-AXIAL SPACE:                   SUBACUTE TERRITORIAL TRANSCORTICAL INFARCT:  A small area of subtle   low attenuation in the left thalamus correlates with the left thalamic   acute/subacute lacunar infarct shown on the MRI yesterday. There is no   hemorrhage or mass effect. MASS:  None detected. HEMORRHAGE:  None. SUBDURAL FLUID COLLECTION:  None detected. HYDROCEPHALUS:  None. REMOTE  TERRITORIAL CEREBRAL INFARCT:  None detected. REMOTE CEREBELLAR INFARCT:  None detected. STRIVE (STandards for Reporting Vascular changes on nEuroimaging):                              --[place] of white matter hypodensity of presumed   vascular origin:  None significant. --[place] of lacunes of presumed vascular origin:    None definite. --Degree of brain atrophy:  None significant.                    [place] OF CALCIFIC INTRACRANIAL ATHEROSCLEROSIS:   Low-grade. HEENT:                   INCLUDED UPPER ORBITS:  Unremarkable. INCLUDED UPPER PARANASAL SINUSES:  Predominantly clear. MASTOID AIR CELLS:  Predominantly clear. BONES:  Unremarkable. SCALP:  Unremarkable.       _______________               CXR Results  (Last 48 hours)    None        1:13 PM  Discussed information with the neuroradiologist Dr. Mark Gonzalez. CT is with no acute finding these are progression of his prior stroke    Per tele-neurology patient did not need any further intervention. Patient can be discharged with close follow-up  Patient is asymptomatic currently no acute symptoms tightness in the arm and leg is resolved  I will have patient follow-up outpatient. ---  Discharge Note:  1:14 PM  The pt is ready for discharge. The pt's signs, symptoms, diagnosis, and discharge instructions have been discussed and pt has conveyed their understanding. The pt is to follow up as recommended or return to ER should their symptoms worsen. Plan has been discussed and pt is in agreement. Discharge     Clinical Impression:   1. Cerebrovascular accident (CVA), unspecified mechanism ([address]    2.  Well adult exam        Disposition:  Home    It should be noted that I will be the provider of record for this patient  Mateo Kirkpatrick MD      Follow-up Information     Follow up With Specialties Details Why [facility] EMERGENCY DEPT Emergency Medicine Call in 1 day Follow Up From Emergency Department [address]  [phone]    your primary care physician and neurologist  Call today Follow Up From Emergency Department           Current Discharge Medication List

## 2020-06-09 ENCOUNTER — TELEPHONE (OUTPATIENT)
Dept: CASE MANAGEMENT | Age: 60
End: 2020-06-09

## 2020-06-09 ENCOUNTER — HOME CARE VISIT (OUTPATIENT)
Dept: HOME HEALTH SERVICES | Facility: HOME HEALTH | Age: 60
End: 2020-06-09

## 2020-06-09 LAB
ATRIAL RATE: 79 BPM
CALCULATED P AXIS, ECG09: 43 DEGREES
CALCULATED R AXIS, ECG10: -27 DEGREES
CALCULATED T AXIS, ECG11: 4 DEGREES
DIAGNOSIS, 93000: NORMAL
P-R INTERVAL, ECG05: 156 MS
Q-T INTERVAL, ECG07: 376 MS
QRS DURATION, ECG06: 94 MS
QTC CALCULATION (BEZET), ECG08: 431 MS
VENTRICULAR RATE, ECG03: 79 BPM

## 2020-06-09 NOTE — TELEPHONE ENCOUNTER
Date/Time:  6/9/2020 9:17 AM  Attempted to reach patient by telephone. Left HIPPA compliant message requesting a return call. Will attempt to reach patient again.

## 2020-06-10 ENCOUNTER — TELEPHONE (OUTPATIENT)
Dept: CASE MANAGEMENT | Age: 60
End: 2020-06-10

## 2020-06-10 NOTE — TELEPHONE ENCOUNTER
Date/Time:  6/10/2020 3:02 PM  Attempted to reach patient by telephone. Left HIPPA compliant message requesting a return call.

## 2020-06-12 ENCOUNTER — HOME CARE VISIT (OUTPATIENT)
Dept: SCHEDULING | Facility: HOME HEALTH | Age: 60
End: 2020-06-12

## 2020-06-12 PROCEDURE — G0299 HHS/HOSPICE OF RN EA 15 MIN: HCPCS

## 2020-06-15 NOTE — TELEPHONE ENCOUNTER
Patient resolved from Transition of Care episode on 6/15/2020    Patient never return my call attempted twice